# Patient Record
Sex: FEMALE | Race: BLACK OR AFRICAN AMERICAN | NOT HISPANIC OR LATINO | Employment: FULL TIME | URBAN - METROPOLITAN AREA
[De-identification: names, ages, dates, MRNs, and addresses within clinical notes are randomized per-mention and may not be internally consistent; named-entity substitution may affect disease eponyms.]

---

## 2021-06-21 ENCOUNTER — HOSPITAL ENCOUNTER (EMERGENCY)
Facility: HOSPITAL | Age: 31
Discharge: HOME | End: 2021-06-22
Attending: EMERGENCY MEDICINE | Admitting: EMERGENCY MEDICINE

## 2021-06-21 ENCOUNTER — APPOINTMENT (OUTPATIENT)
Dept: CT IMAGING | Facility: HOSPITAL | Age: 31
End: 2021-06-21

## 2021-06-21 DIAGNOSIS — D72.829 LEUKOCYTOSIS, UNSPECIFIED TYPE: ICD-10-CM

## 2021-06-21 DIAGNOSIS — K52.9 COLITIS: Primary | ICD-10-CM

## 2021-06-21 LAB
ALBUMIN SERPL-MCNC: 4.3 G/DL (ref 3.5–5.2)
ALBUMIN/GLOB SERPL: 1.3 G/DL
ALP SERPL-CCNC: 86 U/L (ref 39–117)
ALT SERPL W P-5'-P-CCNC: 11 U/L (ref 1–33)
ANION GAP SERPL CALCULATED.3IONS-SCNC: 11.5 MMOL/L (ref 5–15)
AST SERPL-CCNC: 15 U/L (ref 1–32)
BASOPHILS # BLD AUTO: 0.06 10*3/MM3 (ref 0–0.2)
BASOPHILS NFR BLD AUTO: 0.2 % (ref 0–1.5)
BILIRUB SERPL-MCNC: 0.5 MG/DL (ref 0–1.2)
BILIRUB UR QL STRIP: NEGATIVE
BUN SERPL-MCNC: 8 MG/DL (ref 6–20)
BUN/CREAT SERPL: 9.1 (ref 7–25)
CALCIUM SPEC-SCNC: 8.9 MG/DL (ref 8.6–10.5)
CHLORIDE SERPL-SCNC: 101 MMOL/L (ref 98–107)
CLARITY UR: CLEAR
CO2 SERPL-SCNC: 22.5 MMOL/L (ref 22–29)
COLOR UR: YELLOW
CREAT SERPL-MCNC: 0.88 MG/DL (ref 0.57–1)
DEPRECATED RDW RBC AUTO: 43.8 FL (ref 37–54)
EOSINOPHIL # BLD AUTO: 0.02 10*3/MM3 (ref 0–0.4)
EOSINOPHIL NFR BLD AUTO: 0.1 % (ref 0.3–6.2)
ERYTHROCYTE [DISTWIDTH] IN BLOOD BY AUTOMATED COUNT: 15.2 % (ref 12.3–15.4)
GFR SERPL CREATININE-BSD FRML MDRD: 90 ML/MIN/1.73
GLOBULIN UR ELPH-MCNC: 3.2 GM/DL
GLUCOSE SERPL-MCNC: 99 MG/DL (ref 65–99)
GLUCOSE UR STRIP-MCNC: NEGATIVE MG/DL
HCG INTACT+B SERPL-ACNC: <0.5 MIU/ML
HCT VFR BLD AUTO: 33 % (ref 34–46.6)
HGB BLD-MCNC: 11.7 G/DL (ref 12–15.9)
HGB UR QL STRIP.AUTO: NEGATIVE
HOLD SPECIMEN: NORMAL
HOLD SPECIMEN: NORMAL
IMM GRANULOCYTES # BLD AUTO: 0.27 10*3/MM3 (ref 0–0.05)
IMM GRANULOCYTES NFR BLD AUTO: 0.9 % (ref 0–0.5)
KETONES UR QL STRIP: NEGATIVE
LEUKOCYTE ESTERASE UR QL STRIP.AUTO: NEGATIVE
LIPASE SERPL-CCNC: 13 U/L (ref 13–60)
LYMPHOCYTES # BLD AUTO: 1.75 10*3/MM3 (ref 0.7–3.1)
LYMPHOCYTES NFR BLD AUTO: 6 % (ref 19.6–45.3)
MCH RBC QN AUTO: 28.5 PG (ref 26.6–33)
MCHC RBC AUTO-ENTMCNC: 35.5 G/DL (ref 31.5–35.7)
MCV RBC AUTO: 80.3 FL (ref 79–97)
MONOCYTES # BLD AUTO: 1.11 10*3/MM3 (ref 0.1–0.9)
MONOCYTES NFR BLD AUTO: 3.8 % (ref 5–12)
NEUTROPHILS NFR BLD AUTO: 25.77 10*3/MM3 (ref 1.7–7)
NEUTROPHILS NFR BLD AUTO: 89 % (ref 42.7–76)
NITRITE UR QL STRIP: NEGATIVE
NRBC BLD AUTO-RTO: 0 /100 WBC (ref 0–0.2)
PH UR STRIP.AUTO: 5.5 [PH] (ref 5–8)
PLATELET # BLD AUTO: 332 10*3/MM3 (ref 140–450)
PMV BLD AUTO: 10.8 FL (ref 6–12)
POTASSIUM SERPL-SCNC: 3.6 MMOL/L (ref 3.5–5.2)
PROT SERPL-MCNC: 7.5 G/DL (ref 6–8.5)
PROT UR QL STRIP: NEGATIVE
RBC # BLD AUTO: 4.11 10*6/MM3 (ref 3.77–5.28)
SODIUM SERPL-SCNC: 135 MMOL/L (ref 136–145)
SP GR UR STRIP: 1.02 (ref 1–1.03)
UROBILINOGEN UR QL STRIP: NORMAL
WBC # BLD AUTO: 28.98 10*3/MM3 (ref 3.4–10.8)
WHOLE BLOOD HOLD SPECIMEN: NORMAL

## 2021-06-21 PROCEDURE — 85025 COMPLETE CBC W/AUTO DIFF WBC: CPT

## 2021-06-21 PROCEDURE — 83690 ASSAY OF LIPASE: CPT

## 2021-06-21 PROCEDURE — 83605 ASSAY OF LACTIC ACID: CPT | Performed by: NURSE PRACTITIONER

## 2021-06-21 PROCEDURE — 36415 COLL VENOUS BLD VENIPUNCTURE: CPT

## 2021-06-21 PROCEDURE — 25010000002 ONDANSETRON PER 1 MG: Performed by: NURSE PRACTITIONER

## 2021-06-21 PROCEDURE — 87040 BLOOD CULTURE FOR BACTERIA: CPT | Performed by: NURSE PRACTITIONER

## 2021-06-21 PROCEDURE — 84702 CHORIONIC GONADOTROPIN TEST: CPT

## 2021-06-21 PROCEDURE — 80053 COMPREHEN METABOLIC PANEL: CPT

## 2021-06-21 PROCEDURE — 99283 EMERGENCY DEPT VISIT LOW MDM: CPT

## 2021-06-21 PROCEDURE — 81003 URINALYSIS AUTO W/O SCOPE: CPT

## 2021-06-21 PROCEDURE — 96374 THER/PROPH/DIAG INJ IV PUSH: CPT

## 2021-06-21 PROCEDURE — 74177 CT ABD & PELVIS W/CONTRAST: CPT

## 2021-06-21 RX ORDER — SODIUM CHLORIDE 0.9 % (FLUSH) 0.9 %
10 SYRINGE (ML) INJECTION AS NEEDED
Status: DISCONTINUED | OUTPATIENT
Start: 2021-06-21 | End: 2021-06-22 | Stop reason: HOSPADM

## 2021-06-21 RX ORDER — ONDANSETRON 2 MG/ML
4 INJECTION INTRAMUSCULAR; INTRAVENOUS ONCE
Status: COMPLETED | OUTPATIENT
Start: 2021-06-21 | End: 2021-06-21

## 2021-06-21 RX ADMIN — ONDANSETRON 4 MG: 2 INJECTION INTRAMUSCULAR; INTRAVENOUS at 23:40

## 2021-06-21 RX ADMIN — IOPAMIDOL 100 ML: 755 INJECTION, SOLUTION INTRAVENOUS at 23:51

## 2021-06-21 RX ADMIN — SODIUM CHLORIDE 1000 ML: 9 INJECTION, SOLUTION INTRAVENOUS at 23:40

## 2021-06-22 VITALS
WEIGHT: 157.63 LBS | TEMPERATURE: 98.2 F | HEIGHT: 60 IN | OXYGEN SATURATION: 99 % | HEART RATE: 87 BPM | SYSTOLIC BLOOD PRESSURE: 128 MMHG | BODY MASS INDEX: 30.95 KG/M2 | DIASTOLIC BLOOD PRESSURE: 64 MMHG | RESPIRATION RATE: 16 BRPM

## 2021-06-22 LAB — D-LACTATE SERPL-SCNC: 2.1 MMOL/L (ref 0.5–2)

## 2021-06-22 PROCEDURE — 0 IOPAMIDOL PER 1 ML: Performed by: EMERGENCY MEDICINE

## 2021-06-22 RX ORDER — ONDANSETRON 4 MG/1
4 TABLET, ORALLY DISINTEGRATING ORAL EVERY 8 HOURS PRN
Qty: 15 TABLET | Refills: 0 | Status: SHIPPED | OUTPATIENT
Start: 2021-06-22 | End: 2021-08-17

## 2021-06-22 RX ORDER — DICYCLOMINE HCL 20 MG
20 TABLET ORAL EVERY 6 HOURS
Qty: 20 TABLET | Refills: 0 | Status: SHIPPED | OUTPATIENT
Start: 2021-06-22 | End: 2021-08-17

## 2021-06-22 RX ORDER — AMOXICILLIN AND CLAVULANATE POTASSIUM 875; 125 MG/1; MG/1
1 TABLET, FILM COATED ORAL 2 TIMES DAILY
Qty: 20 TABLET | Refills: 0 | Status: SHIPPED | OUTPATIENT
Start: 2021-06-22 | End: 2021-07-02

## 2021-06-22 NOTE — ED PROVIDER NOTES
Subjective   Pt reports generalized abdominal pain since yesterday with low grade fever, nausea an bilateral flank pain.       History provided by:  Patient  Abdominal Pain  Pain location:  Generalized  Pain quality: aching    Pain radiates to:  Does not radiate  Pain severity:  Severe  Duration:  1 day  Timing:  Constant  Progression:  Worsening  Chronicity:  New  Context: not recent illness and not sick contacts    Relieved by:  Nothing  Worsened by:  Nothing  Ineffective treatments:  None tried  Associated symptoms: chills, fever and nausea    Associated symptoms: no chest pain, no cough, no diarrhea, no hematuria, no shortness of breath, no sore throat and no vomiting  Flatus: low grade.        Review of Systems   Constitutional: Positive for chills and fever.   HENT: Negative for congestion, ear pain and sore throat.    Eyes: Negative for pain.   Respiratory: Negative for cough, chest tightness and shortness of breath.    Cardiovascular: Negative for chest pain.   Gastrointestinal: Positive for abdominal pain and nausea. Negative for diarrhea and vomiting. Flatus: low grade.   Genitourinary: Positive for flank pain (bilateral). Negative for hematuria.   Musculoskeletal: Negative for joint swelling.   Skin: Negative for pallor.   Neurological: Negative for seizures and headaches.   All other systems reviewed and are negative.      Past Medical History:   Diagnosis Date   • Hypertension     during pregnancy       No Known Allergies    History reviewed. No pertinent surgical history.    History reviewed. No pertinent family history.    Social History     Socioeconomic History   • Marital status:      Spouse name: Not on file   • Number of children: Not on file   • Years of education: Not on file   • Highest education level: Not on file   Tobacco Use   • Smoking status: Former Smoker   • Smokeless tobacco: Never Used   Vaping Use   • Vaping Use: Every day   Substance and Sexual Activity   • Alcohol use:  Never           Objective   Physical Exam  Vitals and nursing note reviewed.   Constitutional:       General: She is not in acute distress.     Appearance: Normal appearance. She is not toxic-appearing.   HENT:      Head: Normocephalic and atraumatic.      Mouth/Throat:      Mouth: Mucous membranes are moist.   Eyes:      Extraocular Movements: Extraocular movements intact.      Pupils: Pupils are equal, round, and reactive to light.   Cardiovascular:      Rate and Rhythm: Normal rate and regular rhythm.      Pulses: Normal pulses.      Heart sounds: Normal heart sounds.   Pulmonary:      Effort: Pulmonary effort is normal. No respiratory distress.      Breath sounds: Normal breath sounds.   Abdominal:      General: Abdomen is flat.      Palpations: Abdomen is soft.      Tenderness: There is generalized abdominal tenderness.   Musculoskeletal:         General: Normal range of motion.      Cervical back: Normal range of motion and neck supple.   Skin:     General: Skin is warm and dry.      Capillary Refill: Capillary refill takes less than 2 seconds.   Neurological:      Mental Status: She is alert and oriented to person, place, and time. Mental status is at baseline.         Procedures           ED Course  ED Course as of Jun 22 0207   Tue Jun 22, 2021   0050 Discussed CT results with pt and need to follow up today with her PCP for re examination of abdomen. Discussed return to ER precuations, verbalized understanding.     [CM]      ED Course User Index  [CM] Jose Beal, PASTOR                                           MDM  Number of Diagnoses or Management Options  Colitis  Leukocytosis, unspecified type  Diagnosis management comments: Re examination of pt after fluids/Ct results, pt is non toxic in appearance and was resting comfortably on stretcher. She reports she feels a little better after fluids and medications. Due to elevated WBC she will be treated with antibiotics and advised to follow up with  her PCP.        Amount and/or Complexity of Data Reviewed  Clinical lab tests: reviewed and ordered  Tests in the radiology section of CPT®: reviewed and ordered  Discuss the patient with other providers: yes (Dr. Joy, ER attending)    Patient Progress  Patient progress: stable      Final diagnoses:   Colitis   Leukocytosis, unspecified type       ED Disposition  ED Disposition     ED Disposition Condition Comment    Discharge Stable           Provider, No Known  [facility] [id number]  [phone]    Schedule an appointment as soon as possible for a visit today  Re examination of your abdomen         Medication List      New Prescriptions    amoxicillin-clavulanate 875-125 MG per tablet  Commonly known as: AUGMENTIN  Take 1 tablet by mouth 2 (Two) Times a Day for 10 days.     dicyclomine 20 MG tablet  Commonly known as: BENTYL  Take 1 tablet by mouth Every 6 (Six) Hours.     ondansetron ODT 4 MG disintegrating tablet  Commonly known as: ZOFRAN-ODT  Place 1 tablet on the tongue Every 8 (Eight) Hours As Needed for Nausea or Vomiting.           Where to Get Your Medications      These medications were sent to [id number] - [place], [place] - [address] AT [facility] - [phone] [facility] [phone]   [address] [place] [address]    Hours: 24-hours Phone: [phone]   · amoxicillin-clavulanate 875-125 MG per tablet  · dicyclomine 20 MG tablet  · ondansetron ODT 4 MG disintegrating tablet          Jose Beal, APRN  06/22/21 0205

## 2021-06-27 LAB — BACTERIA SPEC AEROBE CULT: NORMAL

## 2021-07-15 ENCOUNTER — OFFICE VISIT (OUTPATIENT)
Dept: FAMILY MEDICINE CLINIC | Facility: CLINIC | Age: 31
End: 2021-07-15

## 2021-07-15 VITALS
HEART RATE: 105 BPM | BODY MASS INDEX: 31.29 KG/M2 | TEMPERATURE: 97.7 F | HEIGHT: 60 IN | OXYGEN SATURATION: 98 % | WEIGHT: 159.4 LBS | DIASTOLIC BLOOD PRESSURE: 78 MMHG | SYSTOLIC BLOOD PRESSURE: 132 MMHG

## 2021-07-15 DIAGNOSIS — D64.9 ANEMIA, UNSPECIFIED TYPE: ICD-10-CM

## 2021-07-15 DIAGNOSIS — R53.83 FATIGUE, UNSPECIFIED TYPE: ICD-10-CM

## 2021-07-15 DIAGNOSIS — D72.829 LEUKOCYTOSIS, UNSPECIFIED TYPE: ICD-10-CM

## 2021-07-15 DIAGNOSIS — Z13.220 SCREENING FOR LIPID DISORDERS: ICD-10-CM

## 2021-07-15 DIAGNOSIS — G47.00 INSOMNIA, UNSPECIFIED TYPE: ICD-10-CM

## 2021-07-15 DIAGNOSIS — K52.9 COLITIS: Primary | ICD-10-CM

## 2021-07-15 LAB
FERRITIN SERPL-MCNC: 22.7 NG/ML (ref 13–150)
IRON 24H UR-MRATE: 54 MCG/DL (ref 37–145)
IRON SATN MFR SERPL: 11 % (ref 20–50)
T-UPTAKE NFR SERPL: 1.13 TBI (ref 0.8–1.3)
T4 SERPL-MCNC: 6.49 MCG/DL (ref 4.5–11.7)
TIBC SERPL-MCNC: 484 MCG/DL (ref 298–536)
TRANSFERRIN SERPL-MCNC: 325 MG/DL (ref 200–360)
TSH SERPL DL<=0.05 MIU/L-ACNC: 1.04 UIU/ML (ref 0.27–4.2)

## 2021-07-15 PROCEDURE — 82306 VITAMIN D 25 HYDROXY: CPT | Performed by: NURSE PRACTITIONER

## 2021-07-15 PROCEDURE — 85007 BL SMEAR W/DIFF WBC COUNT: CPT | Performed by: NURSE PRACTITIONER

## 2021-07-15 PROCEDURE — 84436 ASSAY OF TOTAL THYROXINE: CPT | Performed by: NURSE PRACTITIONER

## 2021-07-15 PROCEDURE — 82607 VITAMIN B-12: CPT | Performed by: NURSE PRACTITIONER

## 2021-07-15 PROCEDURE — 82746 ASSAY OF FOLIC ACID SERUM: CPT | Performed by: NURSE PRACTITIONER

## 2021-07-15 PROCEDURE — 85027 COMPLETE CBC AUTOMATED: CPT | Performed by: NURSE PRACTITIONER

## 2021-07-15 PROCEDURE — 99204 OFFICE O/P NEW MOD 45 MIN: CPT | Performed by: NURSE PRACTITIONER

## 2021-07-15 PROCEDURE — 84443 ASSAY THYROID STIM HORMONE: CPT | Performed by: NURSE PRACTITIONER

## 2021-07-15 PROCEDURE — 82728 ASSAY OF FERRITIN: CPT | Performed by: NURSE PRACTITIONER

## 2021-07-15 PROCEDURE — 84479 ASSAY OF THYROID (T3 OR T4): CPT | Performed by: NURSE PRACTITIONER

## 2021-07-15 PROCEDURE — 84466 ASSAY OF TRANSFERRIN: CPT | Performed by: NURSE PRACTITIONER

## 2021-07-15 PROCEDURE — 83540 ASSAY OF IRON: CPT | Performed by: NURSE PRACTITIONER
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
## 2021-07-16 DIAGNOSIS — E55.9 VITAMIN D DEFICIENCY: Primary | ICD-10-CM

## 2021-07-16 LAB
25(OH)D3 SERPL-MCNC: 12.3 NG/ML
ANISOCYTOSIS BLD QL: ABNORMAL
DEPRECATED RDW RBC AUTO: 46.5 FL (ref 37–54)
EOSINOPHIL # BLD MANUAL: 0.35 10*3/MM3 (ref 0–0.4)
EOSINOPHIL NFR BLD MANUAL: 4.2 % (ref 0.3–6.2)
ERYTHROCYTE [DISTWIDTH] IN BLOOD BY AUTOMATED COUNT: 15.9 % (ref 12.3–15.4)
FOLATE SERPL-MCNC: 10.9 NG/ML (ref 4.78–24.2)
HCT VFR BLD AUTO: 34.1 % (ref 34–46.6)
HGB BLD-MCNC: 11.4 G/DL (ref 12–15.9)
LYMPHOCYTES # BLD MANUAL: 2.63 10*3/MM3 (ref 0.7–3.1)
LYMPHOCYTES NFR BLD MANUAL: 11.6 % (ref 5–12)
LYMPHOCYTES NFR BLD MANUAL: 31.6 % (ref 19.6–45.3)
MCH RBC QN AUTO: 27.1 PG (ref 26.6–33)
MCHC RBC AUTO-ENTMCNC: 33.4 G/DL (ref 31.5–35.7)
MCV RBC AUTO: 81.2 FL (ref 79–97)
MICROCYTES BLD QL: ABNORMAL
MONOCYTES # BLD AUTO: 0.96 10*3/MM3 (ref 0.1–0.9)
NEUTROPHILS # BLD AUTO: 4.37 10*3/MM3 (ref 1.7–7)
NEUTROPHILS NFR BLD MANUAL: 52.6 % (ref 42.7–76)
PLAT MORPH BLD: NORMAL
PLATELET # BLD AUTO: 428 10*3/MM3 (ref 140–450)
PMV BLD AUTO: 11.3 FL (ref 6–12)
POLYCHROMASIA BLD QL SMEAR: ABNORMAL
RBC # BLD AUTO: 4.2 10*6/MM3 (ref 3.77–5.28)
VIT B12 BLD-MCNC: 697 PG/ML (ref 211–946)
WBC # BLD AUTO: 8.31 10*3/MM3 (ref 3.4–10.8)
WBC MORPH BLD: NORMAL

## 2021-07-16 RX ORDER — ERGOCALCIFEROL 1.25 MG/1
50000 CAPSULE ORAL WEEKLY
Qty: 13 CAPSULE | Refills: 1 | Status: SHIPPED | OUTPATIENT
Start: 2021-07-16 | End: 2021-09-22 | Stop reason: SDUPTHER

## 2021-07-19 PROBLEM — D64.9 ANEMIA: Status: ACTIVE | Noted: 2021-07-19

## 2021-07-19 PROBLEM — K52.9 COLITIS: Status: ACTIVE | Noted: 2021-07-19

## 2021-07-19 PROBLEM — G47.00 INSOMNIA: Status: ACTIVE | Noted: 2021-07-19

## 2021-08-17 ENCOUNTER — OFFICE VISIT (OUTPATIENT)
Dept: FAMILY MEDICINE CLINIC | Facility: CLINIC | Age: 31
End: 2021-08-17

## 2021-08-17 VITALS
DIASTOLIC BLOOD PRESSURE: 78 MMHG | WEIGHT: 156.4 LBS | TEMPERATURE: 97.5 F | SYSTOLIC BLOOD PRESSURE: 126 MMHG | OXYGEN SATURATION: 100 % | HEART RATE: 89 BPM | HEIGHT: 60 IN | BODY MASS INDEX: 30.7 KG/M2

## 2021-08-17 DIAGNOSIS — D25.9 UTERINE LEIOMYOMA, UNSPECIFIED LOCATION: ICD-10-CM

## 2021-08-17 DIAGNOSIS — E55.9 VITAMIN D DEFICIENCY: Primary | ICD-10-CM

## 2021-08-17 PROBLEM — K52.9 COLITIS: Status: RESOLVED | Noted: 2021-07-19 | Resolved: 2021-08-17

## 2021-08-17 PROCEDURE — 99213 OFFICE O/P EST LOW 20 MIN: CPT | Performed by: NURSE PRACTITIONER

## 2021-08-17 NOTE — PROGRESS NOTES
"Chief Complaint  Follow-up and Ulcerative Colitis    Subjective          Madeline Veliz presents to [facility]  History of Present Illness  Presents today for a 1 month follow-up for colitis.  Since completing the antibiotic her symptoms have completely resolved.  CBC White blood count has returned to normal.  She is slightly anemic and hemoglobin 11.7.  She denies any abdominal pain diarrhea.  She started taking fiber supplements and having soft bowel movements daily.  She started vitamin D supplements 50,000 units once weekly.  CT scan showed uterine fibroid.  She has an appointment with Dr. Yanez next week.  Objective   Vital Signs:   /78   Pulse 89   Temp 97.5 °F (36.4 °C)   Ht 152.4 cm (60\")   Wt 70.9 kg (156 lb 6.4 oz)   SpO2 100%   BMI 30.54 kg/m²     Physical Exam  Vitals reviewed.   Constitutional:       Appearance: Normal appearance. She is well-developed and overweight.   HENT:      Head: Normocephalic and atraumatic.      Right Ear: External ear normal.      Left Ear: External ear normal.      Mouth/Throat:      Pharynx: No oropharyngeal exudate.   Eyes:      Conjunctiva/sclera: Conjunctivae normal.      Pupils: Pupils are equal, round, and reactive to light.   Cardiovascular:      Rate and Rhythm: Normal rate and regular rhythm.      Heart sounds: No murmur heard.   No friction rub. No gallop.    Pulmonary:      Effort: Pulmonary effort is normal.      Breath sounds: Normal breath sounds. No wheezing or rhonchi.   Abdominal:      General: Bowel sounds are normal. There is no distension.      Palpations: Abdomen is soft.      Tenderness: There is no abdominal tenderness.   Skin:     General: Skin is warm and dry.   Neurological:      Mental Status: She is alert and oriented to person, place, and time.      Cranial Nerves: No cranial nerve deficit.   Psychiatric:         Mood and Affect: Mood and affect normal.         Behavior: Behavior normal.         " Thought Content: Thought content normal.         Judgment: Judgment normal.        Result Review :                 Assessment and Plan    Diagnoses and all orders for this visit:    1. Vitamin D deficiency (Primary)  Assessment & Plan:  Continue vitamin D supplements      2. Uterine leiomyoma, unspecified location  Assessment & Plan:  Consultation with OB/GYN next week        Follow Up   Return in about 3 months (around 11/17/2021) for Next scheduled follow up.  Patient was given instructions and counseling regarding her condition or for health maintenance advice. Please see specific information pulled into the AVS if appropriate.

## 2021-08-27 ENCOUNTER — TELEPHONE (OUTPATIENT)
Dept: FAMILY MEDICINE CLINIC | Facility: CLINIC | Age: 31
End: 2021-08-27

## 2021-08-27 NOTE — TELEPHONE ENCOUNTER
Hub staff attempted to follow warm transfer process and was unsuccessful     Caller: Madeline Veliz    Relationship to patient: Self    Best call back number: [phone]    Patient is needing: SOONER THAN 9/8/21 THAT WAS FIRST AVAILABLE, INFORMED BLOOD PRESSURE TODAY /98

## 2021-08-30 ENCOUNTER — OFFICE VISIT (OUTPATIENT)
Dept: FAMILY MEDICINE CLINIC | Facility: CLINIC | Age: 31
End: 2021-08-30

## 2021-08-30 VITALS
HEART RATE: 77 BPM | TEMPERATURE: 98.1 F | WEIGHT: 155.2 LBS | OXYGEN SATURATION: 99 % | HEIGHT: 60 IN | SYSTOLIC BLOOD PRESSURE: 134 MMHG | BODY MASS INDEX: 30.47 KG/M2 | DIASTOLIC BLOOD PRESSURE: 92 MMHG

## 2021-08-30 DIAGNOSIS — I10 ESSENTIAL HYPERTENSION: Primary | ICD-10-CM

## 2021-08-30 PROCEDURE — 99213 OFFICE O/P EST LOW 20 MIN: CPT | Performed by: NURSE PRACTITIONER

## 2021-08-30 RX ORDER — LABETALOL 100 MG/1
100 TABLET, FILM COATED ORAL 2 TIMES DAILY
Qty: 60 TABLET | Refills: 0 | Status: SHIPPED | OUTPATIENT
Start: 2021-08-30 | End: 2021-09-22 | Stop reason: SDUPTHER

## 2021-08-30 RX ORDER — LOSARTAN POTASSIUM 25 MG/1
25 TABLET ORAL DAILY
Qty: 90 TABLET | Refills: 1 | Status: CANCELLED | OUTPATIENT
Start: 2021-08-30 | End: 2021-11-28

## 2021-08-30 NOTE — PROGRESS NOTES
"Chief Complaint  Hypertension    Subjective        Past Medical History:   Diagnosis Date   • Anxiety    • Depression    • Hypertension     during pregnancy     No past surgical history on file.  Social History     Socioeconomic History   • Marital status:      Spouse name: Not on file   • Number of children: Not on file   • Years of education: Not on file   • Highest education level: Not on file   Tobacco Use   • Smoking status: Former Smoker     Packs/day: 0.25     Years: 10.00     Pack years: 2.50     Quit date: 2021     Years since quittin.3   • Smokeless tobacco: Never Used   Vaping Use   • Vaping Use: Every day   • Substances: Nicotine, Flavoring   • Devices: Disposable   Substance and Sexual Activity   • Alcohol use: Never       Madeline Veliz presents to [facility]  Hypertension  This is a new problem. The current episode started in the past 7 days. The problem is unchanged. The problem is uncontrolled. Pertinent negatives include no anxiety, blurred vision, chest pain, headaches, peripheral edema or shortness of breath. Risk factors for coronary artery disease include family history. Past treatments include nothing. Current antihypertension treatment includes nothing. There is no history of angina or kidney disease. There is no history of chronic renal disease.       Objective   Vital Signs:   /92   Pulse 77   Temp 98.1 °F (36.7 °C)   Ht 152.4 cm (60\")   Wt 70.4 kg (155 lb 3.2 oz)   SpO2 99%   BMI 30.31 kg/m²     Physical Exam  Vitals reviewed.   Constitutional:       Appearance: Normal appearance. She is well-developed.   HENT:      Head: Normocephalic and atraumatic.   Eyes:      Conjunctiva/sclera: Conjunctivae normal.      Pupils: Pupils are equal, round, and reactive to light.   Cardiovascular:      Rate and Rhythm: Normal rate and regular rhythm.      Heart sounds: No murmur heard.   No friction rub. No gallop.    Pulmonary:      Effort: " Pulmonary effort is normal.      Breath sounds: Normal breath sounds. No wheezing or rhonchi.   Skin:     General: Skin is warm and dry.   Neurological:      Mental Status: She is alert and oriented to person, place, and time.   Psychiatric:         Mood and Affect: Mood and affect normal.         Behavior: Behavior normal.         Thought Content: Thought content normal.         Judgment: Judgment normal.        Result Review :   The following data was reviewed by: PASTOR Castaneda on 08/30/2021:  Common labs    Common Labsle 6/21/21 6/21/21 7/15/21    2100 2100    Glucose  99    BUN  8    Creatinine  0.88    eGFR African Am  90    Sodium  135 (A)    Potassium  3.6    Chloride  101    Calcium  8.9    Albumin  4.30    Total Bilirubin  0.5    Alkaline Phosphatase  86    AST (SGOT)  15    ALT (SGPT)  11    WBC 28.98 (A)  8.31   Hemoglobin 11.7 (A)  11.4 (A)   Hematocrit 33.0 (A)  34.1   Platelets 332  428   (A) Abnormal value                       Current Outpatient Medications on File Prior to Visit   Medication Sig Dispense Refill   • vitamin D (ERGOCALCIFEROL) 1.25 MG (98918 UT) capsule capsule Take 1 capsule by mouth 1 (One) Time Per Week. 13 capsule 1     No current facility-administered medications on file prior to visit.       Assessment and Plan    Diagnoses and all orders for this visit:    1. Essential hypertension (Primary)  Comments:  Will start on labetalol 100 mg twice a day discussed with patient to titrate on the medication, giving guidelines for blood pressure, discussed purchasing a blood pressure machine, have patient follow-up with Jude Flowers her PCP in 2 weeks.  Patient is trying to get pregnant which is why we started on labetalol.  She did have a history of preeclampsia in her previous pregnancy, her father has a history of hypertension.    Other orders  -     Cancel: losartan (COZAAR) 25 MG tablet; Take 1 tablet by mouth Daily for 90 days.  Dispense: 90 tablet; Refill: 1  -      labetalol (NORMODYNE) 100 MG tablet; Take 1 tablet by mouth 2 (Two) Times a Day for 30 days.  Dispense: 60 tablet; Refill: 0        Follow Up   No follow-ups on file.  Patient was given instructions and counseling regarding her condition or for health maintenance advice. Please see specific information pulled into the AVS if appropriate.

## 2021-09-13 ENCOUNTER — OFFICE VISIT (OUTPATIENT)
Dept: FAMILY MEDICINE CLINIC | Facility: CLINIC | Age: 31
End: 2021-09-13

## 2021-09-13 VITALS
HEIGHT: 60 IN | WEIGHT: 155.6 LBS | BODY MASS INDEX: 30.55 KG/M2 | TEMPERATURE: 97.5 F | SYSTOLIC BLOOD PRESSURE: 124 MMHG | DIASTOLIC BLOOD PRESSURE: 82 MMHG

## 2021-09-13 DIAGNOSIS — Z09 FOLLOW UP: Primary | ICD-10-CM

## 2021-09-13 DIAGNOSIS — I10 ESSENTIAL HYPERTENSION: ICD-10-CM

## 2021-09-13 PROCEDURE — 99213 OFFICE O/P EST LOW 20 MIN: CPT | Performed by: NURSE PRACTITIONER

## 2021-09-13 RX ORDER — IBUPROFEN 600 MG/1
TABLET ORAL
COMMUNITY
Start: 2021-09-08

## 2021-09-13 NOTE — PROGRESS NOTES
Chief Complaint  Hypertension and Follow-up    Subjective        Past Medical History:   Diagnosis Date   • Anxiety    • Depression    • Hypertension     during pregnancy     Social History     Socioeconomic History   • Marital status:      Spouse name: Not on file   • Number of children: Not on file   • Years of education: Not on file   • Highest education level: Not on file   Tobacco Use   • Smoking status: Former Smoker     Packs/day: 0.25     Years: 10.00     Pack years: 2.50     Quit date: 2021     Years since quittin.3   • Smokeless tobacco: Never Used   Vaping Use   • Vaping Use: Every day   • Substances: Nicotine, Flavoring   • Devices: Disposable   Substance and Sexual Activity   • Alcohol use: Never     History reviewed. No pertinent surgical history.    Madeline Veliz presents to [facility]  2 week follow up.  Patient was started in office on labetalol 2 weeks ago she is here for follow-up on her blood pressure.  Blood pressure today is 1982.  She does admit the medication makes her some what fatigue.  Otherwise patient states she is tolerating the medication all right.  She is currently trying to get pregnant which is why she was started on labetalol as opposed to more, and blood pressure medications.  Labetalol is safe during pregnancy.  Patient did have issues with preeclampsia last pregnancy.    Hypertension  This is a recurrent problem. The current episode started more than 1 month ago. The problem has been waxing and waning since onset. The problem is resistant. Pertinent negatives include no anxiety, blurred vision, peripheral edema or shortness of breath. There are no associated agents to hypertension. Risk factors for coronary artery disease include family history. Past treatments include nothing. Current antihypertension treatment includes beta blockers. The current treatment provides mild improvement. There are no compliance problems.   "There is no history of angina. There is no history of chronic renal disease.       Objective   Vital Signs:   /82   Temp 97.5 °F (36.4 °C)   Ht 152.4 cm (60\")   Wt 70.6 kg (155 lb 9.6 oz)   BMI 30.39 kg/m²     Physical Exam  Vitals reviewed.   Constitutional:       Appearance: Normal appearance. She is well-developed.   HENT:      Head: Normocephalic and atraumatic.   Eyes:      Conjunctiva/sclera: Conjunctivae normal.      Pupils: Pupils are equal, round, and reactive to light.   Cardiovascular:      Rate and Rhythm: Normal rate and regular rhythm.      Heart sounds: No murmur heard.   No friction rub.   Pulmonary:      Effort: Pulmonary effort is normal.      Breath sounds: Normal breath sounds. No wheezing or rhonchi.   Skin:     General: Skin is warm and dry.   Neurological:      Mental Status: She is alert and oriented to person, place, and time.   Psychiatric:         Mood and Affect: Mood and affect normal.         Behavior: Behavior normal.         Thought Content: Thought content normal.         Judgment: Judgment normal.        Result Review :   The following data was reviewed by: PASTOR Castaneda on 09/13/2021:  CMP    CMP 6/21/21   Glucose 99   BUN 8   Creatinine 0.88   eGFR African Am 90   Sodium 135 (A)   Potassium 3.6   Chloride 101   Calcium 8.9   Albumin 4.30   Total Bilirubin 0.5   Alkaline Phosphatase 86   AST (SGOT) 15   ALT (SGPT) 11   (A) Abnormal value            CBC    CBC 6/21/21 7/15/21   WBC 28.98 (A) 8.31   RBC 4.11 4.20   Hemoglobin 11.7 (A) 11.4 (A)   Hematocrit 33.0 (A) 34.1   MCV 80.3 81.2   MCH 28.5 27.1   MCHC 35.5 33.4   RDW 15.2 15.9 (A)   Platelets 332 428   (A) Abnormal value                TSH    TSH 7/15/21   TSH 1.040                          Current Outpatient Medications on File Prior to Visit   Medication Sig Dispense Refill   • ibuprofen (ADVIL,MOTRIN) 600 MG tablet TAKE 1 TABLET BY MOUTH EVERY 6 HOURS WITH FOOD OR MILK AS NEEDED     • labetalol " (NORMODYNE) 100 MG tablet Take 1 tablet by mouth 2 (Two) Times a Day for 30 days. 60 tablet 0   • vitamin D (ERGOCALCIFEROL) 1.25 MG (92740 UT) capsule capsule Take 1 capsule by mouth 1 (One) Time Per Week. 13 capsule 1     No current facility-administered medications on file prior to visit.       Assessment and Plan    Diagnoses and all orders for this visit:    1. Follow up (Primary)    2. Essential hypertension  Comments:  Blood pressure has improved will continue on labetalol and have patient follow-up in 1 month and reassess blood pressure at that time.        Follow Up   Return in about 4 weeks (around 10/11/2021).  Patient was given instructions and counseling regarding her condition or for health maintenance advice. Please see specific information pulled into the AVS if appropriate.

## 2021-09-22 ENCOUNTER — TELEPHONE (OUTPATIENT)
Dept: FAMILY MEDICINE CLINIC | Facility: CLINIC | Age: 31
End: 2021-09-22

## 2021-09-22 ENCOUNTER — OFFICE VISIT (OUTPATIENT)
Dept: FAMILY MEDICINE CLINIC | Facility: CLINIC | Age: 31
End: 2021-09-22

## 2021-09-22 VITALS
SYSTOLIC BLOOD PRESSURE: 124 MMHG | OXYGEN SATURATION: 99 % | DIASTOLIC BLOOD PRESSURE: 82 MMHG | HEIGHT: 60 IN | HEART RATE: 65 BPM | TEMPERATURE: 97.7 F | WEIGHT: 155.8 LBS | BODY MASS INDEX: 30.59 KG/M2

## 2021-09-22 DIAGNOSIS — E55.9 VITAMIN D DEFICIENCY: ICD-10-CM

## 2021-09-22 DIAGNOSIS — I10 ESSENTIAL HYPERTENSION: ICD-10-CM

## 2021-09-22 DIAGNOSIS — Z09 FOLLOW UP: Primary | ICD-10-CM

## 2021-09-22 PROCEDURE — 99213 OFFICE O/P EST LOW 20 MIN: CPT | Performed by: NURSE PRACTITIONER

## 2021-09-22 RX ORDER — LABETALOL 100 MG/1
100 TABLET, FILM COATED ORAL 2 TIMES DAILY
Qty: 180 TABLET | Refills: 1 | Status: SHIPPED | OUTPATIENT
Start: 2021-09-22 | End: 2021-10-11

## 2021-09-22 RX ORDER — ERGOCALCIFEROL 1.25 MG/1
50000 CAPSULE ORAL WEEKLY
Qty: 13 CAPSULE | Refills: 1 | Status: SHIPPED | OUTPATIENT
Start: 2021-09-22 | End: 2022-10-17 | Stop reason: SDUPTHER

## 2021-09-22 NOTE — TELEPHONE ENCOUNTER
Tried to call pt to let them know that she needs to see her PCP for a follow up. Was unable to get a hold of PT

## 2021-09-22 NOTE — PROGRESS NOTES
"Chief Complaint  Hypertension and Follow-up    Subjective        Social History     Socioeconomic History   • Marital status:      Spouse name: Not on file   • Number of children: Not on file   • Years of education: Not on file   • Highest education level: Not on file   Tobacco Use   • Smoking status: Former Smoker     Packs/day: 0.25     Years: 10.00     Pack years: 2.50     Quit date: 2021     Years since quittin.3   • Smokeless tobacco: Never Used   Vaping Use   • Vaping Use: Every day   • Substances: Nicotine, Flavoring   • Devices: Disposable   Substance and Sexual Activity   • Alcohol use: Never     Past Medical History:   Diagnosis Date   • Anxiety    • Depression    • Hypertension     during pregnancy     No past surgical history on file.    Madeline Veliz presents to [facility]  Hypertension  This is a recurrent problem. The current episode started more than 1 month ago. The problem has been waxing and waning since onset. The problem is controlled (now). Associated symptoms include anxiety, headaches and malaise/fatigue. Pertinent negatives include no palpitations, peripheral edema or shortness of breath. There are no associated agents to hypertension. Risk factors for coronary artery disease include obesity and smoking/tobacco exposure. Past treatments include nothing. Current antihypertension treatment includes beta blockers. The current treatment provides significant improvement. There are no compliance problems.  There is no history of angina or retinopathy. There is no history of chronic renal disease or a thyroid problem.       Objective   Vital Signs:   /82   Pulse 65   Temp 97.7 °F (36.5 °C)   Ht 152.4 cm (60\")   Wt 70.7 kg (155 lb 12.8 oz)   SpO2 99%   BMI 30.43 kg/m²     Physical Exam  Vitals reviewed.   Constitutional:       Appearance: Normal appearance. She is well-developed.   HENT:      Head: Normocephalic and atraumatic. "   Eyes:      Conjunctiva/sclera: Conjunctivae normal.      Pupils: Pupils are equal, round, and reactive to light.   Cardiovascular:      Rate and Rhythm: Normal rate and regular rhythm.      Heart sounds: No murmur heard.   No friction rub. No gallop.    Pulmonary:      Effort: Pulmonary effort is normal.      Breath sounds: Normal breath sounds. No wheezing or rhonchi.   Skin:     General: Skin is warm and dry.   Neurological:      Mental Status: She is alert and oriented to person, place, and time.   Psychiatric:         Mood and Affect: Mood and affect normal.         Behavior: Behavior normal.         Thought Content: Thought content normal.         Judgment: Judgment normal.        Result Review :   The following data was reviewed by: PASTOR Castaneda on 09/22/2021:  CMP    CMP 6/21/21   Glucose 99   BUN 8   Creatinine 0.88   eGFR African Am 90   Sodium 135 (A)   Potassium 3.6   Chloride 101   Calcium 8.9   Albumin 4.30   Total Bilirubin 0.5   Alkaline Phosphatase 86   AST (SGOT) 15   ALT (SGPT) 11   (A) Abnormal value            CBC w/diff    CBC w/Diff 6/21/21 7/15/21   WBC 28.98 (A) 8.31   RBC 4.11 4.20   Hemoglobin 11.7 (A) 11.4 (A)   Hematocrit 33.0 (A) 34.1   MCV 80.3 81.2   MCH 28.5 27.1   MCHC 35.5 33.4   RDW 15.2 15.9 (A)   Platelets 332 428   Neutrophil Rel % 89.0 (A)    Immature Granulocyte Rel % 0.9 (A)    Lymphocyte Rel % 6.0 (A)    Monocyte Rel % 3.8 (A)    Eosinophil Rel % 0.1 (A)    Basophil Rel % 0.2    (A) Abnormal value                TSH    TSH 7/15/21   TSH 1.040                     Assessment and Plan    Diagnoses and all orders for this visit:    1. Follow up (Primary)    2. Essential hypertension  Comments:  Doing well on the medication, she is currently on labetalol because she is wanting to get pregnant.  Blood pressure controlled at 124/78 denies any headache torrey chest pain or change in vision.  We will continue on current medication regimen    3. Vitamin D deficiency  -      vitamin D (ERGOCALCIFEROL) 1.25 MG (49956 UT) capsule capsule; Take 1 capsule by mouth 1 (One) Time Per Week.  Dispense: 13 capsule; Refill: 1    Other orders  -     labetalol (NORMODYNE) 100 MG tablet; Take 1 tablet by mouth 2 (Two) Times a Day for 90 days.  Dispense: 180 tablet; Refill: 1        Follow Up   No follow-ups on file.  Patient was given instructions and counseling regarding her condition or for health maintenance advice. Please see specific information pulled into the AVS if appropriate.

## 2021-10-10 RX ORDER — LABETALOL 100 MG/1
TABLET, FILM COATED ORAL
Qty: 60 TABLET | Status: CANCELLED | OUTPATIENT
Start: 2021-10-10

## 2021-10-11 RX ORDER — LABETALOL 100 MG/1
TABLET, FILM COATED ORAL
Qty: 60 TABLET | Refills: 2 | Status: SHIPPED | OUTPATIENT
Start: 2021-10-11 | End: 2022-01-24

## 2022-01-10 ENCOUNTER — TRANSCRIBE ORDERS (OUTPATIENT)
Dept: LAB | Facility: HOSPITAL | Age: 32
End: 2022-01-10

## 2022-01-10 DIAGNOSIS — Z20.2 EXPOSURE TO SEXUALLY TRANSMITTED DISEASE (STD): Primary | ICD-10-CM

## 2022-01-11 ENCOUNTER — LAB (OUTPATIENT)
Dept: LAB | Facility: HOSPITAL | Age: 32
End: 2022-01-11

## 2022-01-11 DIAGNOSIS — Z20.2 EXPOSURE TO SEXUALLY TRANSMITTED DISEASE (STD): ICD-10-CM

## 2022-01-11 LAB
HAV IGM SERPL QL IA: NORMAL
HBV CORE IGM SERPL QL IA: NORMAL
HBV SURFACE AG SERPL QL IA: NORMAL
HCV AB SER DONR QL: NORMAL
HIV1+2 AB SER QL: NORMAL

## 2022-01-11 PROCEDURE — G0432 EIA HIV-1/HIV-2 SCREEN: HCPCS

## 2022-01-11 PROCEDURE — 80074 ACUTE HEPATITIS PANEL: CPT

## 2022-01-11 PROCEDURE — 36415 COLL VENOUS BLD VENIPUNCTURE: CPT

## 2022-01-11 PROCEDURE — 86593 SYPHILIS TEST NON-TREP QUANT: CPT

## 2022-01-15 LAB — RPR SER-TITR: NON REACTIVE {TITER}

## 2022-01-24 ENCOUNTER — TELEPHONE (OUTPATIENT)
Dept: FAMILY MEDICINE CLINIC | Facility: CLINIC | Age: 32
End: 2022-01-24

## 2022-01-24 ENCOUNTER — OFFICE VISIT (OUTPATIENT)
Dept: FAMILY MEDICINE CLINIC | Facility: CLINIC | Age: 32
End: 2022-01-24

## 2022-01-24 VITALS
HEIGHT: 60 IN | SYSTOLIC BLOOD PRESSURE: 134 MMHG | BODY MASS INDEX: 30 KG/M2 | DIASTOLIC BLOOD PRESSURE: 90 MMHG | WEIGHT: 152.8 LBS | HEART RATE: 92 BPM | TEMPERATURE: 98.1 F | OXYGEN SATURATION: 100 %

## 2022-01-24 DIAGNOSIS — I10 PRIMARY HYPERTENSION: Primary | ICD-10-CM

## 2022-01-24 DIAGNOSIS — M25.562 ACUTE PAIN OF LEFT KNEE: ICD-10-CM

## 2022-01-24 DIAGNOSIS — E55.9 VITAMIN D DEFICIENCY: ICD-10-CM

## 2022-01-24 LAB
25(OH)D3 SERPL-MCNC: 36.4 NG/ML (ref 30–100)
ALBUMIN SERPL-MCNC: 4.6 G/DL (ref 3.5–5.2)
ALBUMIN/GLOB SERPL: 1.8 G/DL
ALP SERPL-CCNC: 69 U/L (ref 39–117)
ALT SERPL W P-5'-P-CCNC: 12 U/L (ref 1–33)
ANION GAP SERPL CALCULATED.3IONS-SCNC: 7.9 MMOL/L (ref 5–15)
AST SERPL-CCNC: 21 U/L (ref 1–32)
BASOPHILS # BLD MANUAL: 0.08 10*3/MM3 (ref 0–0.2)
BASOPHILS NFR BLD MANUAL: 1 % (ref 0–1.5)
BILIRUB SERPL-MCNC: 0.2 MG/DL (ref 0–1.2)
BUN SERPL-MCNC: 7 MG/DL (ref 6–20)
BUN/CREAT SERPL: 8.3 (ref 7–25)
CALCIUM SPEC-SCNC: 9.8 MG/DL (ref 8.6–10.5)
CHLORIDE SERPL-SCNC: 103 MMOL/L (ref 98–107)
CHOLEST SERPL-MCNC: 190 MG/DL (ref 0–200)
CO2 SERPL-SCNC: 26.1 MMOL/L (ref 22–29)
CREAT SERPL-MCNC: 0.84 MG/DL (ref 0.57–1)
DEPRECATED RDW RBC AUTO: 47.5 FL (ref 37–54)
EOSINOPHIL # BLD MANUAL: 0.24 10*3/MM3 (ref 0–0.4)
EOSINOPHIL NFR BLD MANUAL: 3 % (ref 0.3–6.2)
ERYTHROCYTE [DISTWIDTH] IN BLOOD BY AUTOMATED COUNT: 18 % (ref 12.3–15.4)
GFR SERPL CREATININE-BSD FRML MDRD: 95 ML/MIN/1.73
GLOBULIN UR ELPH-MCNC: 2.6 GM/DL
GLUCOSE SERPL-MCNC: 92 MG/DL (ref 65–99)
HCT VFR BLD AUTO: 32.4 % (ref 34–46.6)
HDLC SERPL-MCNC: 62 MG/DL (ref 40–60)
HGB BLD-MCNC: 10.5 G/DL (ref 12–15.9)
LDLC SERPL CALC-MCNC: 113 MG/DL (ref 0–100)
LDLC/HDLC SERPL: 1.79 {RATIO}
LYMPHOCYTES # BLD MANUAL: 3.26 10*3/MM3 (ref 0.7–3.1)
LYMPHOCYTES NFR BLD MANUAL: 6 % (ref 5–12)
MCH RBC QN AUTO: 24.2 PG (ref 26.6–33)
MCHC RBC AUTO-ENTMCNC: 32.4 G/DL (ref 31.5–35.7)
MCV RBC AUTO: 74.8 FL (ref 79–97)
MONOCYTES # BLD: 0.49 10*3/MM3 (ref 0.1–0.9)
NEUTROPHILS # BLD AUTO: 4.08 10*3/MM3 (ref 1.7–7)
NEUTROPHILS NFR BLD MANUAL: 50 % (ref 42.7–76)
NRBC BLD AUTO-RTO: 0 /100 WBC (ref 0–0.2)
PLAT MORPH BLD: NORMAL
PLATELET # BLD AUTO: 460 10*3/MM3 (ref 140–450)
PMV BLD AUTO: 10.9 FL (ref 6–12)
POTASSIUM SERPL-SCNC: 4.3 MMOL/L (ref 3.5–5.2)
PROT SERPL-MCNC: 7.2 G/DL (ref 6–8.5)
RBC # BLD AUTO: 4.33 10*6/MM3 (ref 3.77–5.28)
RBC MORPH BLD: NORMAL
SODIUM SERPL-SCNC: 137 MMOL/L (ref 136–145)
TRIGL SERPL-MCNC: 84 MG/DL (ref 0–150)
VARIANT LYMPHS NFR BLD MANUAL: 40 % (ref 19.6–45.3)
VLDLC SERPL-MCNC: 15 MG/DL (ref 5–40)
WBC MORPH BLD: NORMAL
WBC NRBC COR # BLD: 8.16 10*3/MM3 (ref 3.4–10.8)

## 2022-01-24 PROCEDURE — 80053 COMPREHEN METABOLIC PANEL: CPT | Performed by: NURSE PRACTITIONER

## 2022-01-24 PROCEDURE — 82728 ASSAY OF FERRITIN: CPT | Performed by: NURSE PRACTITIONER

## 2022-01-24 PROCEDURE — 84466 ASSAY OF TRANSFERRIN: CPT | Performed by: NURSE PRACTITIONER

## 2022-01-24 PROCEDURE — 85025 COMPLETE CBC W/AUTO DIFF WBC: CPT | Performed by: NURSE PRACTITIONER

## 2022-01-24 PROCEDURE — 85007 BL SMEAR W/DIFF WBC COUNT: CPT | Performed by: NURSE PRACTITIONER

## 2022-01-24 PROCEDURE — 99214 OFFICE O/P EST MOD 30 MIN: CPT | Performed by: NURSE PRACTITIONER

## 2022-01-24 PROCEDURE — 80061 LIPID PANEL: CPT | Performed by: NURSE PRACTITIONER

## 2022-01-24 PROCEDURE — 83540 ASSAY OF IRON: CPT | Performed by: NURSE PRACTITIONER

## 2022-01-24 PROCEDURE — 82306 VITAMIN D 25 HYDROXY: CPT | Performed by: NURSE PRACTITIONER

## 2022-01-24 RX ORDER — HYDROCHLOROTHIAZIDE 12.5 MG/1
12.5 TABLET ORAL DAILY
Qty: 30 TABLET | Refills: 0 | Status: SHIPPED | OUTPATIENT
Start: 2022-01-24 | End: 2022-03-03

## 2022-01-24 NOTE — PROGRESS NOTES
"Chief Complaint  Hypertension    Subjective          Medical History: has a past medical history of Anxiety, Depression, and Hypertension.     Surgical History: has no past surgical history on file.     Family History: family history includes Diabetes in her paternal grandmother.     Social History: reports that she quit smoking about 8 months ago. Her smoking use included cigarettes. She has a 2.50 pack-year smoking history. She has never used smokeless tobacco. She reports that she does not drink alcohol.    Madeline Veliz presents to [facility]  Hypertension  This is a chronic problem. The current episode started more than 1 year ago. The problem has been waxing and waning since onset. The problem is resistant. Associated symptoms include anxiety. Pertinent negatives include no chest pain, peripheral edema or shortness of breath. There are no associated agents to hypertension. There are no known risk factors for coronary artery disease. Past treatments include beta blockers. Current antihypertension treatment includes beta blockers. The current treatment provides mild improvement.   Knee Pain   The incident occurred more than 1 week ago (1 month). There was no injury mechanism. The pain is present in the left knee. The pain is at a severity of 5/10. The pain is moderate. The pain has been fluctuating since onset. Pertinent negatives include no loss of motion, loss of sensation, numbness or tingling. Associated symptoms comments: + for buckling. She reports no foreign bodies present. The symptoms are aggravated by movement. She has tried NSAIDs for the symptoms. The treatment provided no relief.       Objective   Vital Signs:   /90   Pulse 92   Temp 98.1 °F (36.7 °C)   Ht 152.4 cm (60\")   Wt 69.3 kg (152 lb 12.8 oz)   SpO2 100%   BMI 29.84 kg/m²     Physical Exam  Vitals reviewed.   Constitutional:       Appearance: Normal appearance. She is well-developed.   HENT: "      Head: Normocephalic and atraumatic.   Eyes:      Conjunctiva/sclera: Conjunctivae normal.      Pupils: Pupils are equal, round, and reactive to light.   Cardiovascular:      Rate and Rhythm: Normal rate and regular rhythm.      Heart sounds: No murmur heard.  No friction rub.   Pulmonary:      Effort: Pulmonary effort is normal.      Breath sounds: Normal breath sounds. No wheezing or rhonchi.   Musculoskeletal:      Right knee: Normal.      Left knee: No swelling, deformity or erythema. Tenderness present.   Skin:     General: Skin is warm and dry.   Neurological:      Mental Status: She is alert and oriented to person, place, and time.   Psychiatric:         Mood and Affect: Mood and affect normal.         Behavior: Behavior normal.         Thought Content: Thought content normal.         Judgment: Judgment normal.        Result Review :   The following data was reviewed by: PASTOR Castaneda on 01/24/2022:  Common labs    Common Labsle 6/21/21 6/21/21 7/15/21    2100 2100    Glucose  99    BUN  8    Creatinine  0.88    eGFR African Am  90    Sodium  135 (A)    Potassium  3.6    Chloride  101    Calcium  8.9    Albumin  4.30    Total Bilirubin  0.5    Alkaline Phosphatase  86    AST (SGOT)  15    ALT (SGPT)  11    WBC 28.98 (A)  8.31   Hemoglobin 11.7 (A)  11.4 (A)   Hematocrit 33.0 (A)  34.1   Platelets 332  428   (A) Abnormal value            Data reviewed: Previous office note            Current Outpatient Medications on File Prior to Visit   Medication Sig Dispense Refill   • ibuprofen (ADVIL,MOTRIN) 600 MG tablet TAKE 1 TABLET BY MOUTH EVERY 6 HOURS WITH FOOD OR MILK AS NEEDED     • vitamin D (ERGOCALCIFEROL) 1.25 MG (91198 UT) capsule capsule Take 1 capsule by mouth 1 (One) Time Per Week. 13 capsule 1   • [DISCONTINUED] labetalol (NORMODYNE) 100 MG tablet TAKE 1 TABLET BY MOUTH TWICE DAILY 60 tablet 2     No current facility-administered medications on file prior to visit.        Assessment and  Plan    Diagnoses and all orders for this visit:    1. Primary hypertension (Primary)  Comments:  Patient is no longer wanting to get pregnant we will stop labetalol start on HCTZ and have patient follow-up in 1 month.  Orders:  -     CBC & Differential  -     Comprehensive Metabolic Panel  -     Lipid Panel  -     Vitamin D 25 Hydroxy    2. Vitamin D deficiency  Comments:  History of recheck labs today.  Patient is agreeable treatment plan.  Orders:  -     Vitamin D 25 Hydroxy    3. Acute pain of left knee  Comments:  Will give diclofenac sodium gel to use as needed, up to 4 times a day, and x-ray if needed for any acute bony abnormality.  Orders:  -     XR Knee 3 View Left; Future    Other orders  -     Diclofenac Sodium (VOLTAREN) 1 % gel gel; Apply 4 g topically to the appropriate area as directed 4 (Four) Times a Day As Needed (knee pain).  Dispense: 100 g; Refill: 2  -     hydroCHLOROthiazide (HYDRODIURIL) 12.5 MG tablet; Take 1 tablet by mouth Daily.  Dispense: 30 tablet; Refill: 0        Follow Up   Return in about 1 month (around 2/24/2022) for Recheck, blood pressure.  Patient was given instructions and counseling regarding her condition or for health maintenance advice. Please see specific information pulled into the AVS if appropriate.

## 2022-01-24 NOTE — TELEPHONE ENCOUNTER
, Current PCP poonam bravo and Adriano Curiel agree this patient may switch PCP to Adriano Curiel as of 1/24/2022

## 2022-01-25 ENCOUNTER — DOCUMENTATION (OUTPATIENT)
Dept: FAMILY MEDICINE CLINIC | Facility: CLINIC | Age: 32
End: 2022-01-25

## 2022-01-25 DIAGNOSIS — D64.9 LOW HEMOGLOBIN AND LOW HEMATOCRIT: Primary | ICD-10-CM

## 2022-01-25 LAB
FERRITIN SERPL-MCNC: 7.65 NG/ML (ref 13–150)
IRON 24H UR-MRATE: 30 MCG/DL (ref 37–145)
IRON SATN MFR SERPL: 6 % (ref 20–50)
TIBC SERPL-MCNC: 526 MCG/DL (ref 298–536)
TRANSFERRIN SERPL-MCNC: 353 MG/DL (ref 200–360)

## 2022-01-29 PROCEDURE — U0004 COV-19 TEST NON-CDC HGH THRU: HCPCS | Performed by: NURSE PRACTITIONER

## 2022-02-02 RX ORDER — DOXYCYCLINE HYCLATE 50 MG/1
324 CAPSULE, GELATIN COATED ORAL 2 TIMES DAILY
Qty: 180 TABLET | Refills: 1 | Status: SHIPPED | OUTPATIENT
Start: 2022-02-02

## 2022-03-03 RX ORDER — HYDROCHLOROTHIAZIDE 12.5 MG/1
12.5 TABLET ORAL DAILY
Qty: 30 TABLET | Refills: 0 | Status: SHIPPED | OUTPATIENT
Start: 2022-03-03

## 2022-03-04 ENCOUNTER — TELEPHONE (OUTPATIENT)
Dept: FAMILY MEDICINE CLINIC | Facility: CLINIC | Age: 32
End: 2022-03-04

## 2022-03-04 NOTE — TELEPHONE ENCOUNTER
PATIENT MISSED APPOINTMENT ON 02/24/2022 @ 1045 WITH RENE PATEL. CALLED NUMBER IN CHART [phone]. SPOKE TO PATIENT- STATED SHE WAS SICK. EXPLAINED POLICY CONCERNING MISSED APPOINTMENTS AND SAME DAY CANCELLATIONS. RESCHEDULED APPOINTMENT

## 2022-03-30 ENCOUNTER — OFFICE VISIT (OUTPATIENT)
Dept: FAMILY MEDICINE CLINIC | Facility: CLINIC | Age: 32
End: 2022-03-30

## 2022-03-30 VITALS
HEART RATE: 84 BPM | BODY MASS INDEX: 27.23 KG/M2 | OXYGEN SATURATION: 98 % | DIASTOLIC BLOOD PRESSURE: 84 MMHG | WEIGHT: 148 LBS | HEIGHT: 62 IN | TEMPERATURE: 96.9 F | SYSTOLIC BLOOD PRESSURE: 122 MMHG

## 2022-03-30 DIAGNOSIS — Z09 FOLLOW UP: Primary | ICD-10-CM

## 2022-03-30 DIAGNOSIS — I10 ESSENTIAL HYPERTENSION: ICD-10-CM

## 2022-03-30 PROCEDURE — 99213 OFFICE O/P EST LOW 20 MIN: CPT | Performed by: NURSE PRACTITIONER

## 2022-03-30 NOTE — PROGRESS NOTES
"Chief Complaint  Hypertension and Follow-up    Subjective          Medical History: has a past medical history of Anxiety, Depression, and Hypertension.     Surgical History: has no past surgical history on file.     Family History: family history includes Diabetes in her paternal grandmother.     Social History: reports that she quit smoking about 10 months ago. Her smoking use included cigarettes. She has a 2.50 pack-year smoking history. She has never used smokeless tobacco. She reports that she does not drink alcohol and does not use drugs.    Madeline Veliz presents to [facility]  Hypertension  This is a recurrent problem. The current episode started more than 1 year ago. The problem has been waxing and waning since onset. The problem is controlled. Pertinent negatives include no anxiety, chest pain, headaches, malaise/fatigue, peripheral edema or shortness of breath. There are no associated agents to hypertension. Past treatments include beta blockers. Current antihypertension treatment includes diuretics. There are no compliance problems.  There is no history of angina, CVA or retinopathy.       Objective   Vital Signs:   /84   Pulse 84   Temp 96.9 °F (36.1 °C)   Ht 157.5 cm (62\")   Wt 67.1 kg (148 lb)   SpO2 98%   BMI 27.07 kg/m²     Physical Exam  Vitals reviewed.   Constitutional:       Appearance: Normal appearance. She is well-developed.   HENT:      Head: Normocephalic and atraumatic.   Eyes:      Conjunctiva/sclera: Conjunctivae normal.      Pupils: Pupils are equal, round, and reactive to light.   Cardiovascular:      Rate and Rhythm: Normal rate and regular rhythm.      Heart sounds: No murmur heard.  Pulmonary:      Effort: Pulmonary effort is normal.      Breath sounds: Normal breath sounds. No wheezing or rhonchi.   Skin:     General: Skin is warm and dry.   Neurological:      Mental Status: She is alert and oriented to person, place, and time. "   Psychiatric:         Mood and Affect: Mood and affect normal.         Behavior: Behavior normal.         Thought Content: Thought content normal.         Judgment: Judgment normal.        Result Review :   The following data was reviewed by: PASTOR Castaneda on 03/30/2022:  Common labs    Common Labsle 6/21/21 6/21/21 7/15/21 1/24/22 1/24/22 1/24/22    2100 2100  1120 1120 1120   Glucose  99  92     BUN  8  7     Creatinine  0.88  0.84     eGFR African Am  90  95     Sodium  135 (A)  137     Potassium  3.6  4.3     Chloride  101  103     Calcium  8.9  9.8     Albumin  4.30  4.60     Total Bilirubin  0.5  0.2     Alkaline Phosphatase  86  69     AST (SGOT)  15  21     ALT (SGPT)  11  12     WBC 28.98 (A)  8.31   8.16   Hemoglobin 11.7 (A)  11.4 (A)   10.5 (A)   Hematocrit 33.0 (A)  34.1   32.4 (A)   Platelets 332  428   460 (A)   Total Cholesterol     190    Triglycerides     84    HDL Cholesterol     62 (A)    LDL Cholesterol      113 (A)    (A) Abnormal value                        Current Outpatient Medications on File Prior to Visit   Medication Sig Dispense Refill   • Diclofenac Sodium (VOLTAREN) 1 % gel gel Apply 4 g topically to the appropriate area as directed 4 (Four) Times a Day As Needed (knee pain). 100 g 2   • ferrous gluconate (FERGON) 324 MG tablet Take 1 tablet by mouth 2 (Two) Times a Day. 180 tablet 1   • hydroCHLOROthiazide (HYDRODIURIL) 12.5 MG tablet TAKE 1 TABLET BY MOUTH DAILY 30 tablet 0   • ibuprofen (ADVIL,MOTRIN) 600 MG tablet TAKE 1 TABLET BY MOUTH EVERY 6 HOURS WITH FOOD OR MILK AS NEEDED     • vitamin D (ERGOCALCIFEROL) 1.25 MG (97748 UT) capsule capsule Take 1 capsule by mouth 1 (One) Time Per Week. 13 capsule 1     No current facility-administered medications on file prior to visit.        Assessment and Plan    Diagnoses and all orders for this visit:    1. Follow up (Primary)    2. Essential hypertension  Comments:  Blood pressures well controlled at 122/80.  Denies any  headache, chest pain or change in vision.  We will continue on current dose of hydrochlorothiazide        Follow Up   Return in about 6 months (around 9/30/2022) for Recheck.  Patient was given instructions and counseling regarding her condition or for health maintenance advice. Please see specific information pulled into the AVS if appropriate.

## 2022-10-14 ENCOUNTER — OFFICE VISIT (OUTPATIENT)
Dept: FAMILY MEDICINE CLINIC | Facility: CLINIC | Age: 32
End: 2022-10-14

## 2022-10-14 VITALS
WEIGHT: 142 LBS | SYSTOLIC BLOOD PRESSURE: 130 MMHG | OXYGEN SATURATION: 95 % | DIASTOLIC BLOOD PRESSURE: 80 MMHG | HEIGHT: 60 IN | BODY MASS INDEX: 27.88 KG/M2 | TEMPERATURE: 97.3 F | HEART RATE: 91 BPM

## 2022-10-14 DIAGNOSIS — Z00.00 ANNUAL PHYSICAL EXAM: Primary | ICD-10-CM

## 2022-10-14 DIAGNOSIS — E55.9 VITAMIN D DEFICIENCY: ICD-10-CM

## 2022-10-14 DIAGNOSIS — D50.9 IRON DEFICIENCY ANEMIA, UNSPECIFIED IRON DEFICIENCY ANEMIA TYPE: ICD-10-CM

## 2022-10-14 DIAGNOSIS — I10 ESSENTIAL HYPERTENSION: ICD-10-CM

## 2022-10-14 DIAGNOSIS — M25.562 CHRONIC PAIN OF LEFT KNEE: ICD-10-CM

## 2022-10-14 DIAGNOSIS — G89.29 CHRONIC PAIN OF LEFT KNEE: ICD-10-CM

## 2022-10-14 LAB
25(OH)D3 SERPL-MCNC: 24.5 NG/ML (ref 30–100)
ALBUMIN SERPL-MCNC: 4.3 G/DL (ref 3.5–5.2)
ALBUMIN/GLOB SERPL: 1.7 G/DL
ALP SERPL-CCNC: 66 U/L (ref 39–117)
ALT SERPL W P-5'-P-CCNC: 10 U/L (ref 1–33)
ANION GAP SERPL CALCULATED.3IONS-SCNC: 9.5 MMOL/L (ref 5–15)
ANISOCYTOSIS BLD QL: ABNORMAL
AST SERPL-CCNC: 22 U/L (ref 1–32)
BASOPHILS # BLD MANUAL: 0.05 10*3/MM3 (ref 0–0.2)
BASOPHILS NFR BLD MANUAL: 1 % (ref 0–1.5)
BILIRUB SERPL-MCNC: 0.2 MG/DL (ref 0–1.2)
BUN SERPL-MCNC: 8 MG/DL (ref 6–20)
BUN/CREAT SERPL: 9.4 (ref 7–25)
CALCIUM SPEC-SCNC: 9 MG/DL (ref 8.6–10.5)
CHLORIDE SERPL-SCNC: 103 MMOL/L (ref 98–107)
CHOLEST SERPL-MCNC: 181 MG/DL (ref 0–200)
CO2 SERPL-SCNC: 24.5 MMOL/L (ref 22–29)
CREAT SERPL-MCNC: 0.85 MG/DL (ref 0.57–1)
DEPRECATED RDW RBC AUTO: 57.6 FL (ref 37–54)
EGFRCR SERPLBLD CKD-EPI 2021: 92.3 ML/MIN/1.73
EOSINOPHIL # BLD MANUAL: 0.15 10*3/MM3 (ref 0–0.4)
EOSINOPHIL NFR BLD MANUAL: 3.1 % (ref 0.3–6.2)
ERYTHROCYTE [DISTWIDTH] IN BLOOD BY AUTOMATED COUNT: 23.3 % (ref 12.3–15.4)
FERRITIN SERPL-MCNC: 13 NG/ML (ref 13–150)
GLOBULIN UR ELPH-MCNC: 2.6 GM/DL
GLUCOSE SERPL-MCNC: 88 MG/DL (ref 65–99)
HCT VFR BLD AUTO: 30.8 % (ref 34–46.6)
HDLC SERPL-MCNC: 74 MG/DL (ref 40–60)
HGB BLD-MCNC: 9.4 G/DL (ref 12–15.9)
IRON 24H UR-MRATE: 20 MCG/DL (ref 37–145)
IRON SATN MFR SERPL: 4 % (ref 20–50)
LDLC SERPL CALC-MCNC: 95 MG/DL (ref 0–100)
LDLC/HDLC SERPL: 1.28 {RATIO}
LYMPHOCYTES # BLD MANUAL: 1.59 10*3/MM3 (ref 0.7–3.1)
LYMPHOCYTES NFR BLD MANUAL: 4.1 % (ref 5–12)
MCH RBC QN AUTO: 22 PG (ref 26.6–33)
MCHC RBC AUTO-ENTMCNC: 30.5 G/DL (ref 31.5–35.7)
MCV RBC AUTO: 72 FL (ref 79–97)
MICROCYTES BLD QL: ABNORMAL
MONOCYTES # BLD: 0.2 10*3/MM3 (ref 0.1–0.9)
NEUTROPHILS # BLD AUTO: 2.83 10*3/MM3 (ref 1.7–7)
NEUTROPHILS NFR BLD MANUAL: 58.8 % (ref 42.7–76)
PLAT MORPH BLD: NORMAL
PLATELET # BLD AUTO: 466 10*3/MM3 (ref 140–450)
PMV BLD AUTO: 10 FL (ref 6–12)
POTASSIUM SERPL-SCNC: 4.3 MMOL/L (ref 3.5–5.2)
PROT SERPL-MCNC: 6.9 G/DL (ref 6–8.5)
RBC # BLD AUTO: 4.28 10*6/MM3 (ref 3.77–5.28)
SODIUM SERPL-SCNC: 137 MMOL/L (ref 136–145)
TIBC SERPL-MCNC: 514 MCG/DL (ref 298–536)
TRANSFERRIN SERPL-MCNC: 345 MG/DL (ref 200–360)
TRIGL SERPL-MCNC: 63 MG/DL (ref 0–150)
TSH SERPL DL<=0.05 MIU/L-ACNC: 1.18 UIU/ML (ref 0.27–4.2)
VARIANT LYMPHS NFR BLD MANUAL: 33 % (ref 19.6–45.3)
VLDLC SERPL-MCNC: 12 MG/DL (ref 5–40)
WBC MORPH BLD: NORMAL
WBC NRBC COR # BLD: 4.81 10*3/MM3 (ref 3.4–10.8)

## 2022-10-14 PROCEDURE — 82728 ASSAY OF FERRITIN: CPT | Performed by: NURSE PRACTITIONER

## 2022-10-14 PROCEDURE — 80050 GENERAL HEALTH PANEL: CPT | Performed by: NURSE PRACTITIONER

## 2022-10-14 PROCEDURE — 80061 LIPID PANEL: CPT | Performed by: NURSE PRACTITIONER

## 2022-10-14 PROCEDURE — 82306 VITAMIN D 25 HYDROXY: CPT | Performed by: NURSE PRACTITIONER

## 2022-10-14 PROCEDURE — 99213 OFFICE O/P EST LOW 20 MIN: CPT | Performed by: NURSE PRACTITIONER

## 2022-10-14 PROCEDURE — 84466 ASSAY OF TRANSFERRIN: CPT | Performed by: NURSE PRACTITIONER

## 2022-10-14 PROCEDURE — 85007 BL SMEAR W/DIFF WBC COUNT: CPT | Performed by: NURSE PRACTITIONER

## 2022-10-14 PROCEDURE — 2014F MENTAL STATUS ASSESS: CPT | Performed by: NURSE PRACTITIONER

## 2022-10-14 PROCEDURE — 99395 PREV VISIT EST AGE 18-39: CPT | Performed by: NURSE PRACTITIONER

## 2022-10-14 PROCEDURE — 3008F BODY MASS INDEX DOCD: CPT | Performed by: NURSE PRACTITIONER

## 2022-10-14 PROCEDURE — 83540 ASSAY OF IRON: CPT | Performed by: NURSE PRACTITIONER

## 2022-10-14 RX ORDER — SODIUM FLUORIDE 6 MG/ML
PASTE, DENTIFRICE DENTAL
COMMUNITY
Start: 2022-09-09

## 2022-10-14 NOTE — PROGRESS NOTES
Chief Complaint  Hypertension (Follow up) and Annual Exam    Subjective          Medical History: has a past medical history of Anxiety, Depression, and Hypertension.     Surgical History: has no past surgical history on file.     Family History: family history includes Diabetes in her paternal grandmother; Hypertension in her father; French Parkinson White syndrome in her mother.     Social History: reports that she quit smoking about 17 months ago. Her smoking use included cigarettes. She has a 2.50 pack-year smoking history. She has never used smokeless tobacco. She reports that she does not drink alcohol and does not use drugs.    Madeline Veliz presents to [facility]  History of Present Illness    ENCOUNTER DATE:  10/14/2022     Madeline Veliz / 32 y.o. / female      CHIEF COMPLAINT    No chief complaint on file.      VITALS    There were no vitals taken for this visit.    BP Readings from Last 3 Encounters:  03/30/22 : 122/84  01/29/22 : 128/89  01/24/22 : 134/90    Wt Readings from Last 3 Encounters:  03/30/22 : 67.1 kg (148 lb)  01/29/22 : 68.9 kg (152 lb)  01/24/22 : 69.3 kg (152 lb 12.8 oz)      HISTORY OF PRESENT ILLNESS    Madeline presents for annual health maintenance visit.    Last health maintenance visit: approximately 1 year ago  General health: good  Lifestyle:  Attempting to lose weight?: Yes   Diet: eats a well balanced, healthy diet  Exercise: exercises 4 days/week  Tobacco: Vapes regularly    Alcohol: does not drink  Work: Full-time  Reproductive health:  Sexually active?: No   Sexual problems?: No problems  Concern for STD?: No    Sees Gynecologist?: No   Dhara/Postmenopausal?: No   Domestic abuse concerns: No   Depression Screening:     PHQ-2: 0 (Not depressed)  PHQ-9: 0 (Negative screening for depression)    Patient Care Team:  Adriano Curiel APRN as PCP - General (Nurse Practitioner)      ALLERGIES  No Known Allergies     PFSH:     The  following portions of the patient's history were reviewed and updated as appropriate: Allergies / Current Medications / Past Medical History / Surgical History / Social History / Family History    PROBLEM LIST   Patient Active Problem List:    Anemia    Insomnia    Vitamin D deficiency    Uterine fibroid      PAST MEDICAL HISTORY  Past Medical History:  No date: Anxiety  No date: Depression  No date: Hypertension     Comment:  during pregnancy    SURGICAL HISTORY  No past surgical history on file.    SOCIAL HISTORY  Social History   Socioeconomic History     Marital status:    Tobacco Use     Smoking status: Former       Packs/day: 0.25       Years: 10.00       Pack years: 2.5       Types: Cigarettes       Quit date: 2021       Years since quittin.4     Smokeless tobacco: Never   Vaping Use     Vaping Use: Every day       Substances: Nicotine, Flavoring       Devices: Disposable   Substance and Sexual Activity     Alcohol use: Never     Drug use: Never     Sexual activity: Defer      FAMILY HISTORY  Review of patient's family history indicates:      IMMUNIZATION HISTORY    Immunization History  Administered            Date(s) Administered   COVID-19 (PFIZER) PURPLE CAP                         2021     Hepatitis B Vaccine Adult IM                         2003  10/17/2003     MMR                   1999     Td                    2003     Tdap                  2016      Labs:    Lab Results      Component                Value               Date                      NA                       137                 2022                K                        4.3                 2022                CALCIUM                  9.8                 2022                AST                      21                  2022                ALT                      12                  2022                BUN                      7                    01/24/2022                CREATININE               0.84                01/24/2022                CREATININE               0.88                06/21/2021                CREATININE               0.74                06/18/2020                EGFRIFAFRI               95                  01/24/2022              Lab Results      Component                Value               Date                      TSH                      1.040               07/15/2021              Lab Results      Component                Value               Date                      LDL                      113 (H)             01/24/2022                HDL                      62 (H)              01/24/2022                TRIG                     84                  01/24/2022              Lab Results      Component                Value               Date                      [id number]                 36.4                01/24/2022               Lab Results      Component                Value               Date                      WBC                      8.16                01/24/2022                HGB                      10.5 (L)            01/24/2022                MCV                      74.8 (L)            01/24/2022                PLT                      460 (H)             01/24/2022              Lab Results      Component                Value               Date                      GLUCOSEU                 Negative            06/21/2021                BLOODU                   Negative            06/21/2021                NITRITEU                 Negative            06/21/2021                LEUKOCYTESUR             Negative            06/21/2021              Lab Results      Component                Value               Date                      HEPCVIRUSABY             Non-Reactive        01/11/2022                    Cancer Screening:  Colon: Initial/Next screening at age: 43  Repeat colon cancer screening: N/A at this time  Breast:  "Recommended monthly self exams; annual professional exam  Mammogram: N/A at this time  Cervical: up to date 2/2022  Skin: Monthly self skin examination, annual exam by health professional      Lifestyle Counseling:  Lifestyle Modifications: Attempt to lose weight, Continue good lifestyle choices/modifications and Improve dietary compliance  Safety Issues: Always wear seatbelt, Avoid texting while driving   Use sunscreen, regular skin examination  Recommended annual dental/vision examination.  Emotional/Stress/Sleep: Reviewed and  given when appropriate  Knee Pain   The incident occurred more than 1 week ago. There was no injury mechanism. The pain is present in the left knee. The pain is mild. The pain has been intermittent since onset. Pertinent negatives include no inability to bear weight, loss of motion, numbness or tingling. She reports no foreign bodies present. The symptoms are aggravated by weight bearing and palpation. She has tried rest and NSAIDs for the symptoms. The treatment provided mild relief.     Hypertension  This is a recurrent problem. The current episode started more than 1 year ago. The problem has been waxing and waning since onset. The problem is controlled. Pertinent negatives include no anxiety, chest pain, headaches, malaise/fatigue, peripheral edema or shortness of breath. There are no associated agents to hypertension. Past treatments include beta blockers. Current antihypertension treatment includes diuretics. There are no compliance problems.  There is no history of angina, CVA or retinopathy.     Objective   Vital Signs:   /80 (BP Location: Right arm, Patient Position: Sitting, Cuff Size: Adult)   Pulse 91   Temp 97.3 °F (36.3 °C) (Infrared)   Ht 152.4 cm (60\")   Wt 64.4 kg (142 lb)   SpO2 95%   BMI 27.73 kg/m²     Physical Exam  Vitals reviewed.   Constitutional:       Appearance: Normal appearance. She is well-developed.   HENT:      Head: Normocephalic and " atraumatic.   Eyes:      Conjunctiva/sclera: Conjunctivae normal.      Pupils: Pupils are equal, round, and reactive to light.   Cardiovascular:      Rate and Rhythm: Normal rate and regular rhythm.      Heart sounds: No murmur heard.  Pulmonary:      Effort: Pulmonary effort is normal.      Breath sounds: Normal breath sounds. No wheezing or rhonchi.   Musculoskeletal:      Left knee: Bony tenderness and crepitus present.   Skin:     General: Skin is warm and dry.   Neurological:      Mental Status: She is alert and oriented to person, place, and time.   Psychiatric:         Mood and Affect: Mood and affect normal.         Behavior: Behavior normal.         Thought Content: Thought content normal.         Judgment: Judgment normal.        Result Review :   The following data was reviewed by: PASTOR Castaneda on 10/14/2022:  Common labs    Common Labs 1/24/22 1/24/22 1/24/22    1120 1120 1120   Glucose 92     BUN 7     Creatinine 0.84     eGFR African Am 95     Sodium 137     Potassium 4.3     Chloride 103     Calcium 9.8     Albumin 4.60     Total Bilirubin 0.2     Alkaline Phosphatase 69     AST (SGOT) 21     ALT (SGPT) 12     WBC   8.16   Hemoglobin   10.5 (A)   Hematocrit   32.4 (A)   Platelets   460 (A)   Total Cholesterol  190    Triglycerides  84    HDL Cholesterol  62 (A)    LDL Cholesterol   113 (A)    (A) Abnormal value            Data reviewed: Previous office note            Current Outpatient Medications on File Prior to Visit   Medication Sig Dispense Refill   • Diclofenac Sodium (VOLTAREN) 1 % gel gel Apply 4 g topically to the appropriate area as directed 4 (Four) Times a Day As Needed (knee pain). 100 g 2   • ferrous gluconate (FERGON) 324 MG tablet Take 1 tablet by mouth 2 (Two) Times a Day. 180 tablet 1   • hydroCHLOROthiazide (HYDRODIURIL) 12.5 MG tablet TAKE 1 TABLET BY MOUTH DAILY 30 tablet 0   • ibuprofen (ADVIL,MOTRIN) 600 MG tablet TAKE 1 TABLET BY MOUTH EVERY 6 HOURS WITH FOOD OR  MILK AS NEEDED     • Sodium Fluoride 5000 PPM 1.1 % paste      • vitamin D (ERGOCALCIFEROL) 1.25 MG (50129 UT) capsule capsule Take 1 capsule by mouth 1 (One) Time Per Week. 13 capsule 1     No current facility-administered medications on file prior to visit.        Assessment and Plan    Diagnoses and all orders for this visit:    1. Annual physical exam (Primary)  -     CBC & Differential  -     Comprehensive Metabolic Panel  -     Lipid Panel  -     TSH    2. Essential hypertension  Comments:  Pressure stable, will continue on current medication regimen.  Orders:  -     CBC & Differential  -     Comprehensive Metabolic Panel    3. Iron deficiency anemia, unspecified iron deficiency anemia type  Comments:  We will check labs, adjust medication if needed  Orders:  -     Iron Profile  -     Ferritin    4. Vitamin D deficiency  Comments:  Will check labs, adjust medication if needed  Orders:  -     Vitamin D 25 Hydroxy    5. Chronic pain of left knee  Comments:  Will reorder x-ray, patient is agreeable treatment plan.  Orders:  -     XR Knee 3 View Left; Future        Follow Up   Return in about 6 months (around 4/14/2023) for Recheck.  Patient was given instructions and counseling regarding her condition or for health maintenance advice. Please see specific information pulled into the AVS if appropriate.

## 2022-10-17 DIAGNOSIS — D50.9 IRON DEFICIENCY ANEMIA, UNSPECIFIED IRON DEFICIENCY ANEMIA TYPE: Primary | ICD-10-CM

## 2022-10-17 DIAGNOSIS — E55.9 VITAMIN D DEFICIENCY: ICD-10-CM

## 2022-10-17 RX ORDER — ERGOCALCIFEROL 1.25 MG/1
50000 CAPSULE ORAL WEEKLY
Qty: 13 CAPSULE | Refills: 1 | Status: SHIPPED | OUTPATIENT
Start: 2022-10-17

## 2022-10-31 ENCOUNTER — APPOINTMENT (OUTPATIENT)
Dept: ONCOLOGY | Facility: HOSPITAL | Age: 32
End: 2022-10-31

## 2022-12-08 ENCOUNTER — CONSULT (OUTPATIENT)
Dept: ONCOLOGY | Facility: HOSPITAL | Age: 32
End: 2022-12-08

## 2022-12-08 ENCOUNTER — LAB (OUTPATIENT)
Dept: ONCOLOGY | Facility: HOSPITAL | Age: 32
End: 2022-12-08

## 2022-12-08 VITALS
TEMPERATURE: 97.9 F | DIASTOLIC BLOOD PRESSURE: 76 MMHG | WEIGHT: 142.86 LBS | RESPIRATION RATE: 16 BRPM | OXYGEN SATURATION: 100 % | BODY MASS INDEX: 27.9 KG/M2 | SYSTOLIC BLOOD PRESSURE: 126 MMHG | HEART RATE: 99 BPM

## 2022-12-08 DIAGNOSIS — D50.9 IRON DEFICIENCY ANEMIA, UNSPECIFIED IRON DEFICIENCY ANEMIA TYPE: Primary | ICD-10-CM

## 2022-12-08 DIAGNOSIS — D50.9 IRON DEFICIENCY ANEMIA, UNSPECIFIED IRON DEFICIENCY ANEMIA TYPE: ICD-10-CM

## 2022-12-08 DIAGNOSIS — D50.9 MICROCYTIC ANEMIA: ICD-10-CM

## 2022-12-08 DIAGNOSIS — N92.0 MENORRHAGIA WITH REGULAR CYCLE: ICD-10-CM

## 2022-12-08 LAB
ANISOCYTOSIS BLD QL: ABNORMAL
DEPRECATED RDW RBC AUTO: 45.1 FL (ref 37–54)
EOSINOPHIL # BLD MANUAL: 0.33 10*3/MM3 (ref 0–0.4)
EOSINOPHIL NFR BLD MANUAL: 5 % (ref 0.3–6.2)
ERYTHROCYTE [DISTWIDTH] IN BLOOD BY AUTOMATED COUNT: 18.7 % (ref 12.3–15.4)
FERRITIN SERPL-MCNC: 5.06 NG/ML (ref 13–150)
HCT VFR BLD AUTO: 29.2 % (ref 34–46.6)
HGB BLD-MCNC: 9.7 G/DL (ref 12–15.9)
IRON 24H UR-MRATE: 31 MCG/DL (ref 37–145)
IRON SATN MFR SERPL: 6 % (ref 20–50)
LYMPHOCYTES # BLD MANUAL: 2.43 10*3/MM3 (ref 0.7–3.1)
MCH RBC QN AUTO: 22.6 PG (ref 26.6–33)
MCHC RBC AUTO-ENTMCNC: 33.2 G/DL (ref 31.5–35.7)
MCV RBC AUTO: 67.9 FL (ref 79–97)
MICROCYTES BLD QL: ABNORMAL
NEUTROPHILS # BLD AUTO: 3.8 10*3/MM3 (ref 1.7–7)
NEUTROPHILS NFR BLD MANUAL: 52 % (ref 42.7–76)
NEUTS BAND NFR BLD MANUAL: 6 % (ref 0–5)
PATHOLOGY REVIEW: YES
PLATELET # BLD AUTO: 333 10*3/MM3 (ref 140–450)
PMV BLD AUTO: 9.4 FL (ref 6–12)
RBC # BLD AUTO: 4.3 10*6/MM3 (ref 3.77–5.28)
RETICS # AUTO: 0.06 10*6/MM3 (ref 0.02–0.13)
RETICS/RBC NFR AUTO: 1.35 % (ref 0.7–1.9)
SMALL PLATELETS BLD QL SMEAR: ADEQUATE
TIBC SERPL-MCNC: 524 MCG/DL (ref 298–536)
TRANSFERRIN SERPL-MCNC: 352 MG/DL (ref 200–360)
VARIANT LYMPHS NFR BLD MANUAL: 37 % (ref 19.6–45.3)
WBC MORPH BLD: NORMAL
WBC NRBC COR # BLD: 6.56 10*3/MM3 (ref 3.4–10.8)

## 2022-12-08 PROCEDURE — 84466 ASSAY OF TRANSFERRIN: CPT | Performed by: NURSE PRACTITIONER

## 2022-12-08 PROCEDURE — 83020 HEMOGLOBIN ELECTROPHORESIS: CPT | Performed by: NURSE PRACTITIONER

## 2022-12-08 PROCEDURE — 99214 OFFICE O/P EST MOD 30 MIN: CPT | Performed by: NURSE PRACTITIONER

## 2022-12-08 PROCEDURE — 82607 VITAMIN B-12: CPT | Performed by: NURSE PRACTITIONER

## 2022-12-08 PROCEDURE — 83540 ASSAY OF IRON: CPT | Performed by: NURSE PRACTITIONER

## 2022-12-08 PROCEDURE — 82728 ASSAY OF FERRITIN: CPT | Performed by: NURSE PRACTITIONER

## 2022-12-08 PROCEDURE — 36415 COLL VENOUS BLD VENIPUNCTURE: CPT | Performed by: NURSE PRACTITIONER

## 2022-12-08 PROCEDURE — 83021 HEMOGLOBIN CHROMOTOGRAPHY: CPT | Performed by: NURSE PRACTITIONER

## 2022-12-08 PROCEDURE — 85045 AUTOMATED RETICULOCYTE COUNT: CPT | Performed by: NURSE PRACTITIONER

## 2022-12-08 PROCEDURE — 85025 COMPLETE CBC W/AUTO DIFF WBC: CPT | Performed by: NURSE PRACTITIONER

## 2022-12-08 PROCEDURE — G0463 HOSPITAL OUTPT CLINIC VISIT: HCPCS | Performed by: NURSE PRACTITIONER

## 2022-12-08 PROCEDURE — 82746 ASSAY OF FOLIC ACID SERUM: CPT | Performed by: NURSE PRACTITIONER

## 2022-12-08 NOTE — PROGRESS NOTES
Chief Complaint/Reason for Referral:  Iron deficiency anemia      Adriano Curiel, *  Adriano Curiel, APRN    Records Obtained:  Records of the patients history including those obtained from  [facility] and patient information were reviewed and summarized in detail.    Madeline Veliz presents to [facility] for iron deficinency anemia    Subjective  History of present illness: Ms. Madeline Veliz presents for new patient evaluation for iron deficiency anemia. She is a pleasant 32 year old  female. Medical history of heavy menses since she started her menstrual cycles which last from 5-7 days and reports all the days ae heavy. Also has depression, anxiety, anemia, insomnia, vitamin D deficiency, history of uterine fibroids. Former smoker of 1 pack every 2-3 days and quit. She now uses Vape. Denies any alcohol use. She was started on oral iron 1 tab QD but reports she has not been compliant with taking the oral iron, but does does states when she did take iron tablets, she was having nausea. She has never had prior iron infusions in the past. Reports she has fatigue, but works 6 days a week as a  at [employer] here locally. Reports craving ice.     Recent labs 10/14/2022: Iron 20, ferritin 13, iron sat of 4%, TIBC 514, WBC 4.81, hemoglobin 9.4. MCV 72.0, platelet count 466.      Oncology/Hematology History    No history exists.       Review of Systems   Constitutional: Positive for fatigue.   HENT: Negative.    Eyes: Negative.    Respiratory: Negative.    Cardiovascular: Negative.    Gastrointestinal: Negative.    Endocrine: Negative.    Genitourinary: Negative.    Musculoskeletal: Negative.    Allergic/Immunologic: Negative.    Neurological: Negative.    Hematological: Negative.    Psychiatric/Behavioral: Negative.        Current Outpatient Medications on File Prior to Visit   Medication Sig Dispense Refill   • Diclofenac Sodium (VOLTAREN) 1  % gel gel Apply 4 g topically to the appropriate area as directed 4 (Four) Times a Day As Needed (knee pain). 100 g 2   • ferrous gluconate (FERGON) 324 MG tablet Take 1 tablet by mouth 2 (Two) Times a Day. 180 tablet 1   • hydroCHLOROthiazide (HYDRODIURIL) 12.5 MG tablet TAKE 1 TABLET BY MOUTH DAILY 30 tablet 0   • ibuprofen (ADVIL,MOTRIN) 600 MG tablet TAKE 1 TABLET BY MOUTH EVERY 6 HOURS WITH FOOD OR MILK AS NEEDED     • Sodium Fluoride 5000 PPM 1.1 % paste      • vitamin D (ERGOCALCIFEROL) 1.25 MG (59266 UT) capsule capsule Take 1 capsule by mouth 1 (One) Time Per Week. 13 capsule 1     No current facility-administered medications on file prior to visit.       No Known Allergies  Past Medical History:   Diagnosis Date   • Anxiety    • Depression    • Hypertension     during pregnancy     History reviewed. No pertinent surgical history.  Social History     Socioeconomic History   • Marital status:    Tobacco Use   • Smoking status: Former     Packs/day: 0.25     Years: 10.00     Pack years: 2.50     Types: Cigarettes     Quit date: 2021     Years since quittin.6   • Smokeless tobacco: Never   Vaping Use   • Vaping Use: Every day   • Substances: Nicotine, Flavoring   • Devices: Disposable   Substance and Sexual Activity   • Alcohol use: Never   • Drug use: Never   • Sexual activity: Defer     Family History   Problem Relation Age of Onset   • French Parkinson White syndrome Mother    • Hypertension Father    • Diabetes Paternal Grandmother      Immunization History   Administered Date(s) Administered   • COVID-19 (PFIZER) PURPLE CAP 2021, 2021   • Hepatitis B Vaccine Adult IM 2003, 10/17/2003   • MMR 1999   • Td 2003   • Tdap 2016       Tobacco Use: Medium Risk   • Smoking Tobacco Use: Former   • Smokeless Tobacco Use: Never   • Passive Exposure: Not on file       Objective     Physical Exam  Vitals and nursing note reviewed.   Constitutional:       Appearance:  Normal appearance. She is normal weight.   HENT:      Head: Normocephalic.      Nose: Nose normal.      Mouth/Throat:      Mouth: Mucous membranes are moist.   Eyes:      Pupils: Pupils are equal, round, and reactive to light.   Cardiovascular:      Rate and Rhythm: Normal rate and regular rhythm.      Pulses: Normal pulses.      Heart sounds: Normal heart sounds.   Pulmonary:      Effort: Pulmonary effort is normal.      Breath sounds: Normal breath sounds.   Abdominal:      General: Bowel sounds are normal.      Palpations: Abdomen is soft.   Musculoskeletal:         General: Normal range of motion.      Cervical back: Normal range of motion and neck supple.   Skin:     General: Skin is warm and dry.      Capillary Refill: Capillary refill takes less than 2 seconds.   Neurological:      General: No focal deficit present.      Mental Status: She is alert and oriented to person, place, and time.   Psychiatric:         Mood and Affect: Mood normal.         Behavior: Behavior normal.         Thought Content: Thought content normal.         Judgment: Judgment normal.         Vitals:    12/08/22 1526   BP: 126/76   Pulse: 99   Resp: 16   Temp: 97.9 °F (36.6 °C)   TempSrc: Temporal   SpO2: 100%   Weight: 64.8 kg (142 lb 13.7 oz)   PainSc: 0-No pain       Wt Readings from Last 3 Encounters:   12/08/22 64.8 kg (142 lb 13.7 oz)   10/14/22 64.4 kg (142 lb)   03/30/22 67.1 kg (148 lb)          ECOG score: 0         ECOG: (0) Fully Active - Able to Carry On All Pre-disease Performance Without Restriction  Fall Risk Assessment was completed, and patient is at low risk for falls.  PHQ-9 Total Score: 0       The patient is  experiencing fatigue. Fatigue score: 0    PT/OT Functional Screening: PT fx screen: No needs identified  Speech Functional Screening: Speech fx screen: No needs identified  Rehab to be ordered: Rehab to be ordered: No needs identified        Result Review :  The following data was reviewed by: Monika RENDON  MiriamPASTOR on 12/08/2022:  Lab Results   Component Value Date    HGB 9.4 (L) 10/14/2022    HCT 30.8 (L) 10/14/2022    MCV 72.0 (L) 10/14/2022     (H) 10/14/2022    WBC 4.81 10/14/2022    NEUTROABS 2.83 10/14/2022    LYMPHSABS 1.75 06/21/2021    MONOSABS 1.11 (H) 06/21/2021    EOSABS 0.15 10/14/2022    BASOSABS 0.05 10/14/2022     Lab Results   Component Value Date    GLUCOSE 88 10/14/2022    BUN 8 10/14/2022    CREATININE 0.85 10/14/2022     10/14/2022    K 4.3 10/14/2022     10/14/2022    CO2 24.5 10/14/2022    CALCIUM 9.0 10/14/2022    PROTEINTOT 6.9 10/14/2022    ALBUMIN 4.30 10/14/2022    BILITOT 0.2 10/14/2022    ALKPHOS 66 10/14/2022    AST 22 10/14/2022    ALT 10 10/14/2022       No Images in the past 120 days found..         Assessment and Plan:  Diagnoses and all orders for this visit:    1. Iron deficiency anemia, unspecified iron deficiency anemia type (Primary)  -     CBC & Differential  -     Iron Profile  -     Ferritin  -     Peripheral Blood Smear  -     Reticulocytes  -     Vitamin B12  -     Folate  -     Hemoglobinopathy Fractionation Cascade  -     CBC & Differential; Future  -     Iron Profile; Future  -     Ferritin; Future    2. Microcytic anemia  -     CBC & Differential  -     Iron Profile  -     Ferritin  -     Peripheral Blood Smear  -     Reticulocytes  -     Vitamin B12  -     Folate  -     Hemoglobinopathy Fractionation Cascade    3. Menorrhagia with regular cycle  -     CBC & Differential  -     Iron Profile  -     Ferritin  -     Peripheral Blood Smear  -     Reticulocytes  -     Vitamin B12  -     Folate  -     Hemoglobinopathy Fractionation Cascade      Microcytic anemia with iron deficiency anemia noted as well. She has thrombocytosis on recent labs which is likely related to the iron deficiency anemia. History of heavy menses since starting menstrual cycles. History of uterine fibroid on CT scan from 6/21/2021. We discussed to follow up with GYN to see if  they could place her on oral contraception to lessen the effects of the heavy menses which would help the MANUELA. She is not quite sure about an ablation as she may be anticipating future pregnancies. She has a 16 year old child.     We discussed side effects of iron infusions to include swelling, hypersensitivity reactions to include cough, SOA, flushing, itching and she will obtain premeds prior to iron infusion. She is agreeable to plan of care.     Will recheck labs today including CBC, iron panel, ferritin, folate, B-12, hemoglobin electrophoresis since she has microcytic anemia as well. She has never been told she has sickle cell trait or thalassemia.     She will follow up 1 month after iron infusions with lab check 1-2 days prior to 2 month follow up to see if iron levels have been repleted.     I spent 30 minutes caring for Madeline on this date of service. This time includes time spent by me in the following activities:preparing for the visit, reviewing tests, obtaining and/or reviewing a separately obtained history, performing a medically appropriate examination and/or evaluation , counseling and educating the patient/family/caregiver, ordering medications, tests, or procedures, referring and communicating with other health care professionals , documenting information in the medical record and independently interpreting results and communicating that information with the patient/family/caregiver    Patient Follow Up: 2 months with labs 1-2 days prior.     Patient was given instructions and counseling regarding her condition or for health maintenance advice. Please see specific information pulled into the AVS if appropriate.     Monika Pineda, APRN    12/8/2022    .[email or web address]

## 2022-12-09 LAB
FOLATE SERPL-MCNC: >20 NG/ML (ref 4.78–24.2)
VIT B12 BLD-MCNC: 530 PG/ML (ref 211–946)

## 2022-12-12 LAB
CYTO UR: NORMAL
HGB A MFR BLD ELPH: NORMAL % (ref 96.4–98.8)
HGB A MFR BLD HPLC: 62.1 % (ref 96.4–98.8)
HGB A2 MFR BLD ELPH: NORMAL % (ref 1.8–3.2)
HGB A2 MFR BLD HPLC: 2.9 % (ref 1.8–3.2)
HGB C MFR BLD HPLC: 33.8 %
HGB E MFR BLD HPLC: 0 %
HGB F MFR BLD ELPH: NORMAL % (ref 0–2)
HGB F MFR BLD HPLC: 1.2 % (ref 0–2)
HGB FRACT BLD-IMP: ABNORMAL
HGB FRACT BLD-IMP: NORMAL
HGB S MFR BLD ELPH: NORMAL %
HGB S MFR BLD HPLC: 0 %
LAB AP CASE REPORT: NORMAL
LAB AP CLINICAL INFORMATION: NORMAL
PATH REPORT.FINAL DX SPEC: NORMAL
PATH REPORT.GROSS SPEC: NORMAL

## 2022-12-27 RX ORDER — LABETALOL 100 MG/1
TABLET, FILM COATED ORAL
Qty: 60 TABLET | Refills: 2 | OUTPATIENT
Start: 2022-12-27

## 2023-02-09 ENCOUNTER — LAB (OUTPATIENT)
Dept: ONCOLOGY | Facility: HOSPITAL | Age: 33
End: 2023-02-09
Payer: COMMERCIAL

## 2023-02-09 DIAGNOSIS — D50.9 IRON DEFICIENCY ANEMIA, UNSPECIFIED IRON DEFICIENCY ANEMIA TYPE: ICD-10-CM

## 2023-02-09 LAB
ANISOCYTOSIS BLD QL: NORMAL
BASOPHILS # BLD AUTO: 0.07 10*3/MM3 (ref 0–0.2)
BASOPHILS NFR BLD AUTO: 0.9 % (ref 0–1.5)
DEPRECATED RDW RBC AUTO: 48.3 FL (ref 37–54)
EOSINOPHIL # BLD AUTO: 0.23 10*3/MM3 (ref 0–0.4)
EOSINOPHIL NFR BLD AUTO: 2.9 % (ref 0.3–6.2)
ERYTHROCYTE [DISTWIDTH] IN BLOOD BY AUTOMATED COUNT: 20.6 % (ref 12.3–15.4)
FERRITIN SERPL-MCNC: 6.83 NG/ML (ref 13–150)
HCT VFR BLD AUTO: 29.1 % (ref 34–46.6)
HGB BLD-MCNC: 9.9 G/DL (ref 12–15.9)
IMM GRANULOCYTES # BLD AUTO: 0.03 10*3/MM3 (ref 0–0.05)
IMM GRANULOCYTES NFR BLD AUTO: 0.4 % (ref 0–0.5)
IRON 24H UR-MRATE: 36 MCG/DL (ref 37–145)
IRON SATN MFR SERPL: 7 % (ref 20–50)
LYMPHOCYTES # BLD AUTO: 2.82 10*3/MM3 (ref 0.7–3.1)
LYMPHOCYTES NFR BLD AUTO: 35.3 % (ref 19.6–45.3)
MCH RBC QN AUTO: 22.8 PG (ref 26.6–33)
MCHC RBC AUTO-ENTMCNC: 34 G/DL (ref 31.5–35.7)
MCV RBC AUTO: 66.9 FL (ref 79–97)
MICROCYTES BLD QL: NORMAL
MONOCYTES # BLD AUTO: 0.82 10*3/MM3 (ref 0.1–0.9)
MONOCYTES NFR BLD AUTO: 10.3 % (ref 5–12)
NEUTROPHILS NFR BLD AUTO: 4.01 10*3/MM3 (ref 1.7–7)
NEUTROPHILS NFR BLD AUTO: 50.2 % (ref 42.7–76)
NRBC BLD AUTO-RTO: 0 /100 WBC (ref 0–0.2)
PLATELET # BLD AUTO: 312 10*3/MM3 (ref 140–450)
PMV BLD AUTO: 9.6 FL (ref 6–12)
POLYCHROMASIA BLD QL SMEAR: NORMAL
RBC # BLD AUTO: 4.35 10*6/MM3 (ref 3.77–5.28)
SMALL PLATELETS BLD QL SMEAR: ADEQUATE
TARGETS BLD QL SMEAR: NORMAL
TIBC SERPL-MCNC: 536 MCG/DL (ref 298–536)
TRANSFERRIN SERPL-MCNC: 360 MG/DL (ref 200–360)
WBC MORPH BLD: NORMAL
WBC NRBC COR # BLD: 7.98 10*3/MM3 (ref 3.4–10.8)

## 2023-02-09 PROCEDURE — 85025 COMPLETE CBC W/AUTO DIFF WBC: CPT

## 2023-02-09 PROCEDURE — 83540 ASSAY OF IRON: CPT

## 2023-02-09 PROCEDURE — 82728 ASSAY OF FERRITIN: CPT

## 2023-02-09 PROCEDURE — 36415 COLL VENOUS BLD VENIPUNCTURE: CPT

## 2023-02-09 PROCEDURE — 85007 BL SMEAR W/DIFF WBC COUNT: CPT

## 2023-02-09 PROCEDURE — 84466 ASSAY OF TRANSFERRIN: CPT

## 2023-02-10 ENCOUNTER — OFFICE VISIT (OUTPATIENT)
Dept: ONCOLOGY | Facility: HOSPITAL | Age: 33
End: 2023-02-10
Payer: COMMERCIAL

## 2023-02-10 VITALS
HEIGHT: 60 IN | SYSTOLIC BLOOD PRESSURE: 137 MMHG | DIASTOLIC BLOOD PRESSURE: 84 MMHG | WEIGHT: 153.22 LBS | HEART RATE: 109 BPM | OXYGEN SATURATION: 100 % | BODY MASS INDEX: 30.08 KG/M2 | RESPIRATION RATE: 16 BRPM | TEMPERATURE: 97.3 F

## 2023-02-10 DIAGNOSIS — K90.9 MALABSORPTION OF IRON: ICD-10-CM

## 2023-02-10 DIAGNOSIS — D50.9 IRON DEFICIENCY ANEMIA, UNSPECIFIED IRON DEFICIENCY ANEMIA TYPE: Primary | ICD-10-CM

## 2023-02-10 PROCEDURE — 99214 OFFICE O/P EST MOD 30 MIN: CPT | Performed by: INTERNAL MEDICINE

## 2023-02-10 PROCEDURE — G0463 HOSPITAL OUTPT CLINIC VISIT: HCPCS | Performed by: INTERNAL MEDICINE

## 2023-02-10 RX ORDER — SODIUM CHLORIDE 9 MG/ML
250 INJECTION, SOLUTION INTRAVENOUS ONCE
OUTPATIENT
Start: 2023-02-10

## 2023-02-10 RX ORDER — SODIUM CHLORIDE 9 MG/ML
250 INJECTION, SOLUTION INTRAVENOUS ONCE
OUTPATIENT
Start: 2023-02-24

## 2023-02-10 RX ORDER — SODIUM CHLORIDE 9 MG/ML
250 INJECTION, SOLUTION INTRAVENOUS ONCE
OUTPATIENT
Start: 2023-03-03

## 2023-02-10 RX ORDER — ACETAMINOPHEN 325 MG/1
650 TABLET ORAL ONCE
OUTPATIENT
Start: 2023-02-24

## 2023-02-10 RX ORDER — ACETAMINOPHEN 325 MG/1
650 TABLET ORAL ONCE
OUTPATIENT
Start: 2023-03-03

## 2023-02-10 RX ORDER — ACETAMINOPHEN 325 MG/1
650 TABLET ORAL ONCE
OUTPATIENT
Start: 2023-02-10

## 2023-02-10 RX ORDER — SODIUM CHLORIDE 9 MG/ML
250 INJECTION, SOLUTION INTRAVENOUS ONCE
OUTPATIENT
Start: 2023-02-17

## 2023-02-10 RX ORDER — ACETAMINOPHEN 325 MG/1
650 TABLET ORAL ONCE
OUTPATIENT
Start: 2023-02-17

## 2023-02-10 NOTE — PROGRESS NOTES
Chief Complaint/Reason for Referral:  Iron deficiency anemia      Adriano Curiel, *  Adriano Curiel, APRN    Records Obtained:  Records of the patients history including those obtained from  [facility] and patient information were reviewed and summarized in detail.    Madeline Veliz presents to [facility] for iron deficinency anemia    Subjective  History of present illness: Ms. Madeline Veliz presents for follow up iron  She has heavy menses since she started her menstrual cycles which last from 5-7 days and reports all the days are heavy. Also has depression, anxiety, anemia, insomnia, vitamin D deficiency, history of uterine fibroids. Former smoker of 1 pack every 2-3 days and quit. She now uses Vape. Denies any alcohol use. Not able to tolerate oral iron. No bleeding otherwise. Breathing is good. No fevers, chills, infections. She is fatigued daily.       Hematology History    10/14/2022: Iron 20, ferritin 13, iron sat of 4%, TIBC 514, WBC 4.81, hemoglobin 9.4. MCV 72.0, platelet count 466.    12/8/22: Hgb electrophoresis: 62% HbA, 34% HbC, no sickle cell. Iron sat 6%, ferritin 5, iron 31. Hgb 9.7    2/9/23: Iron 36, iron sat 7%, ferritin 6.8, TIBC 536. Hgb 9.9        Oncology/Hematology History    No history exists.       Review of Systems   Constitutional: Positive for fatigue.   HENT: Negative.    Eyes: Negative.    Respiratory: Negative.    Cardiovascular: Negative.    Gastrointestinal: Negative.    Endocrine: Negative.    Genitourinary: Positive for vaginal bleeding.   Musculoskeletal: Negative.    Allergic/Immunologic: Negative.    Neurological: Negative.    Hematological: Negative.    Psychiatric/Behavioral: Negative.    All other systems reviewed and are negative.      Current Outpatient Medications on File Prior to Visit   Medication Sig Dispense Refill   • Diclofenac Sodium (VOLTAREN) 1 % gel gel Apply 4 g topically to the appropriate area as  directed 4 (Four) Times a Day As Needed (knee pain). 100 g 2   • ferrous gluconate (FERGON) 324 MG tablet Take 1 tablet by mouth 2 (Two) Times a Day. 180 tablet 1   • hydroCHLOROthiazide (HYDRODIURIL) 12.5 MG tablet TAKE 1 TABLET BY MOUTH DAILY 30 tablet 0   • ibuprofen (ADVIL,MOTRIN) 600 MG tablet TAKE 1 TABLET BY MOUTH EVERY 6 HOURS WITH FOOD OR MILK AS NEEDED     • Sodium Fluoride 5000 PPM 1.1 % paste      • vitamin D (ERGOCALCIFEROL) 1.25 MG (28061 UT) capsule capsule Take 1 capsule by mouth 1 (One) Time Per Week. 13 capsule 1     No current facility-administered medications on file prior to visit.       No Known Allergies  Past Medical History:   Diagnosis Date   • Anxiety    • Depression    • Hypertension     during pregnancy     History reviewed. No pertinent surgical history.  Social History     Socioeconomic History   • Marital status:    Tobacco Use   • Smoking status: Former     Packs/day: 0.25     Years: 10.00     Pack years: 2.50     Types: Cigarettes     Quit date: 2021     Years since quittin.7   • Smokeless tobacco: Never   Vaping Use   • Vaping Use: Every day   • Substances: Nicotine, Flavoring   • Devices: Disposable   Substance and Sexual Activity   • Alcohol use: Never   • Drug use: Never   • Sexual activity: Defer     Family History   Problem Relation Age of Onset   • French Parkinson White syndrome Mother    • Hypertension Father    • Diabetes Paternal Grandmother      Immunization History   Administered Date(s) Administered   • COVID-19 (PFIZER) PURPLE CAP 2021, 2021   • Hepatitis B Vaccine Adult IM 2003, 10/17/2003   • MMR 1999   • Td 2003   • Tdap 2016       Tobacco Use: Medium Risk   • Smoking Tobacco Use: Former   • Smokeless Tobacco Use: Never   • Passive Exposure: Not on file       Objective     Physical Exam  Vitals and nursing note reviewed.   Constitutional:       Appearance: Normal appearance. She is normal weight.   HENT:      Head:  "Normocephalic.      Nose: Nose normal.      Mouth/Throat:      Mouth: Mucous membranes are moist.   Eyes:      Pupils: Pupils are equal, round, and reactive to light.   Pulmonary:      Effort: Pulmonary effort is normal.   Musculoskeletal:         General: Normal range of motion.      Cervical back: Normal range of motion and neck supple.   Skin:     General: Skin is warm and dry.   Neurological:      General: No focal deficit present.      Mental Status: She is alert and oriented to person, place, and time.   Psychiatric:         Mood and Affect: Mood normal.         Behavior: Behavior normal.         Thought Content: Thought content normal.         Judgment: Judgment normal.         Vitals:    02/10/23 1127   BP: 137/84   Pulse: 109   Resp: 16   Temp: 97.3 °F (36.3 °C)   SpO2: 100%   Weight: 69.5 kg (153 lb 3.5 oz)   Height: 152.4 cm (60\")   PainSc: 0-No pain       Wt Readings from Last 3 Encounters:   12/08/22 64.8 kg (142 lb 13.7 oz)   10/14/22 64.4 kg (142 lb)   03/30/22 67.1 kg (148 lb)                    ECOG: (0) Fully Active - Able to Carry On All Pre-disease Performance Without Restriction  Fall Risk Assessment was completed, and patient is at low risk for falls.  PHQ-9 Total Score:         The patient is  experiencing fatigue. Fatigue score: 0    PT/OT Functional Screening: PT fx screen: No needs identified  Speech Functional Screening: Speech fx screen: No needs identified  Rehab to be ordered: Rehab to be ordered: No needs identified        Result Review :  The following data was reviewed by: Adam Renae MD on 02/10/23:  Lab Results   Component Value Date    HGB 9.9 (L) 02/09/2023    HCT 29.1 (L) 02/09/2023    MCV 66.9 (L) 02/09/2023     02/09/2023    WBC 7.98 02/09/2023    NEUTROABS 4.01 02/09/2023    LYMPHSABS 2.82 02/09/2023    MONOSABS 0.82 02/09/2023    EOSABS 0.23 02/09/2023    BASOSABS 0.07 02/09/2023     Lab Results   Component Value Date    GLUCOSE 88 10/14/2022    BUN 8 " 10/14/2022    CREATININE 0.85 10/14/2022     10/14/2022    K 4.3 10/14/2022     10/14/2022    CO2 24.5 10/14/2022    CALCIUM 9.0 10/14/2022    PROTEINTOT 6.9 10/14/2022    ALBUMIN 4.30 10/14/2022    BILITOT 0.2 10/14/2022    ALKPHOS 66 10/14/2022    AST 22 10/14/2022    ALT 10 10/14/2022       Labs reviewed. Anemia present. Iron low.         Assessment and Plan:  Diagnoses and all orders for this visit:    1. Iron deficiency anemia, unspecified iron deficiency anemia type (Primary)  -     Iron Profile; Future  -     CBC & Differential; Future  -     Ferritin; Future  -     ONCBCN INFUSION APPOINTMENT REQUEST 01; Future  -     ONCBCN INFUSION APPOINTMENT REQUEST 01; Future  -     ONCBCN INFUSION APPOINTMENT REQUEST 01; Future  -     ONCBCN INFUSION APPOINTMENT REQUEST 01; Future    2. Malabsorption of iron  -     ONCBCN INFUSION APPOINTMENT REQUEST 01; Future  -     ONCBCN INFUSION APPOINTMENT REQUEST 01; Future  -     ONCBCN INFUSION APPOINTMENT REQUEST 01; Future  -     ONCBCN INFUSION APPOINTMENT REQUEST 01; Future    Other orders  -     sodium chloride 0.9 % infusion 250 mL  -     acetaminophen (TYLENOL) tablet 650 mg  -     diphenhydrAMINE (BENADRYL) 25 mg in sodium chloride 0.9 % 50 mL IVPB  -     ferric gluconate (FERRLECIT) 250 mg in sodium chloride 0.9 % 270 mL IVPB  -     sodium chloride 0.9 % infusion 250 mL  -     acetaminophen (TYLENOL) tablet 650 mg  -     diphenhydrAMINE (BENADRYL) 25 mg in sodium chloride 0.9 % 50 mL IVPB  -     ferric gluconate (FERRLECIT) 250 mg in sodium chloride 0.9 % 270 mL IVPB  -     sodium chloride 0.9 % infusion 250 mL  -     acetaminophen (TYLENOL) tablet 650 mg  -     diphenhydrAMINE (BENADRYL) 25 mg in sodium chloride 0.9 % 50 mL IVPB  -     ferric gluconate (FERRLECIT) 250 mg in sodium chloride 0.9 % 270 mL IVPB  -     sodium chloride 0.9 % infusion 250 mL  -     acetaminophen (TYLENOL) tablet 650 mg  -     diphenhydrAMINE (BENADRYL) 25 mg in sodium chloride  0.9 % 50 mL IVPB  -     ferric gluconate (FERRLECIT) 250 mg in sodium chloride 0.9 % 270 mL IVPB      Microcytic anemia with iron deficiency anemia noted as well.  History of heavy menses since starting menstrual cycles as likely soruce. History of uterine fibroid on CT scan from 6/21/2021. We discussed to follow up with GYN to see if they could place her on oral contraception to lessen the effects of the heavy menses which would help the MANUELA. She is not quite sure about an ablation as she may be anticipating future pregnancies. She has a 16 year old child.     IV iron infusions have been scheduled. Plan to repeat CBC and iron studies in 3 months to monitor improvement.    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 Likely has no clinical significance as she does not have any thalassemia or sickle cell disease concurrent with it.       I spent 30 minutes caring for Madeline on this date of service. This time includes time spent by me in the following activities:preparing for the visit, reviewing tests, obtaining and/or reviewing a separately obtained history, performing a medically appropriate examination and/or evaluation , counseling and educating the patient/family/caregiver, ordering medications, tests, or procedures, referring and communicating with other health care professionals , documenting information in the medical record and independently interpreting results and communicating that information with the patient/family/caregiver    Patient Follow Up: 3 months    Patient was given instructions and counseling regarding her condition or for health maintenance advice. Please see specific information pulled into the AVS if appropriate.

## 2023-04-12 ENCOUNTER — HOSPITAL ENCOUNTER (OUTPATIENT)
Dept: ONCOLOGY | Facility: HOSPITAL | Age: 33
Discharge: HOME | End: 2023-04-12
Admitting: INTERNAL MEDICINE
Payer: COMMERCIAL

## 2023-04-12 VITALS
SYSTOLIC BLOOD PRESSURE: 135 MMHG | DIASTOLIC BLOOD PRESSURE: 84 MMHG | TEMPERATURE: 98.4 F | OXYGEN SATURATION: 98 % | HEART RATE: 99 BPM | RESPIRATION RATE: 15 BRPM

## 2023-04-12 DIAGNOSIS — D50.9 IRON DEFICIENCY ANEMIA, UNSPECIFIED IRON DEFICIENCY ANEMIA TYPE: Primary | ICD-10-CM

## 2023-04-12 DIAGNOSIS — K90.9 MALABSORPTION OF IRON: ICD-10-CM

## 2023-04-12 PROCEDURE — 25010000002 NA FERRIC GLUC CPLX PER 12.5 MG: Performed by: INTERNAL MEDICINE

## 2023-04-12 PROCEDURE — 96366 THER/PROPH/DIAG IV INF ADDON: CPT

## 2023-04-12 PROCEDURE — 96375 TX/PRO/DX INJ NEW DRUG ADDON: CPT

## 2023-04-12 PROCEDURE — 96365 THER/PROPH/DIAG IV INF INIT: CPT

## 2023-04-12 PROCEDURE — 25010000002 DIPHENHYDRAMINE PER 50 MG: Performed by: INTERNAL MEDICINE

## 2023-04-12 RX ORDER — SODIUM CHLORIDE 9 MG/ML
250 INJECTION, SOLUTION INTRAVENOUS ONCE
Status: COMPLETED | OUTPATIENT
Start: 2023-04-12 | End: 2023-04-12

## 2023-04-12 RX ORDER — ACETAMINOPHEN 325 MG/1
650 TABLET ORAL ONCE
Status: COMPLETED | OUTPATIENT
Start: 2023-04-12 | End: 2023-04-12

## 2023-04-12 RX ADMIN — SODIUM CHLORIDE 250 ML: 9 INJECTION, SOLUTION INTRAVENOUS at 13:40

## 2023-04-12 RX ADMIN — SODIUM CHLORIDE 250 MG: 9 INJECTION, SOLUTION INTRAVENOUS at 14:03

## 2023-04-12 RX ADMIN — ACETAMINOPHEN 650 MG: 325 TABLET ORAL at 13:37

## 2023-04-12 RX ADMIN — DIPHENHYDRAMINE HYDROCHLORIDE 25 MG: 50 INJECTION, SOLUTION INTRAMUSCULAR; INTRAVENOUS at 13:41

## 2023-04-21 ENCOUNTER — OFFICE VISIT (OUTPATIENT)
Dept: FAMILY MEDICINE CLINIC | Facility: CLINIC | Age: 33
End: 2023-04-21
Payer: COMMERCIAL

## 2023-04-21 VITALS
SYSTOLIC BLOOD PRESSURE: 126 MMHG | HEIGHT: 60 IN | TEMPERATURE: 97.7 F | HEART RATE: 93 BPM | WEIGHT: 155 LBS | DIASTOLIC BLOOD PRESSURE: 86 MMHG | OXYGEN SATURATION: 100 % | BODY MASS INDEX: 30.43 KG/M2

## 2023-04-21 DIAGNOSIS — Z09 FOLLOW-UP EXAM, 3-6 MONTHS SINCE PREVIOUS EXAM: Primary | ICD-10-CM

## 2023-04-21 DIAGNOSIS — E66.9 CLASS 1 OBESITY WITH SERIOUS COMORBIDITY AND BODY MASS INDEX (BMI) OF 30.0 TO 30.9 IN ADULT, UNSPECIFIED OBESITY TYPE: ICD-10-CM

## 2023-04-21 DIAGNOSIS — I10 PRIMARY HYPERTENSION: ICD-10-CM

## 2023-04-21 RX ORDER — MAGNESIUM OXIDE 400 MG/1
400 TABLET ORAL DAILY
Qty: 90 TABLET | Refills: 1 | Status: SHIPPED | OUTPATIENT
Start: 2023-04-21

## 2023-04-21 NOTE — PROGRESS NOTES
"Chief Complaint  Follow-up (6M routine check up, pt is currently having Iron infusions)    Subjective        Medical History: has a past medical history of Anxiety, Depression, and Hypertension.     Surgical History: has no past surgical history on file.     Family History: family history includes Diabetes in her paternal grandmother; Hypertension in her father; French Parkinson White syndrome in her mother.     Social History: reports that she quit smoking about 1 years ago. Her smoking use included cigarettes. She has a 2.50 pack-year smoking history. She has never used smokeless tobacco. She reports that she does not drink alcohol and does not use drugs.    Madeline Veliz presents to [facility]  History of Present Illness  Hypertension  This is a recurrent problem. The current episode started more than 1 year ago. The problem has been waxing and waning since onset. The problem is controlled. Pertinent negatives include no anxiety, chest pain, headaches, malaise/fatigue, peripheral edema or shortness of breath. There are no associated agents to hypertension. Past treatments include beta blockers. Current antihypertension treatment includes diuretics. There are no compliance problems.  There is no history of angina, CVA or retinopathy.     Objective   Vital Signs:   /86 (BP Location: Left arm, Patient Position: Sitting, Cuff Size: Adult)   Pulse 93   Temp 97.7 °F (36.5 °C) (Temporal)   Ht 152.4 cm (60\")   Wt 70.3 kg (155 lb)   SpO2 100%   BMI 30.27 kg/m²       Wt Readings from Last 3 Encounters:   04/21/23 70.3 kg (155 lb)   02/10/23 69.5 kg (153 lb 3.5 oz)   12/08/22 64.8 kg (142 lb 13.7 oz)        BP Readings from Last 3 Encounters:   04/21/23 126/86   04/12/23 135/84   02/10/23 137/84        BMI is >= 25 and <30. (Overweight) The following options were offered after discussion;: weight loss educational material (shared in after visit summary)       Physical " Exam  Vitals reviewed.   Constitutional:       Appearance: Normal appearance. She is well-developed. She is obese.   HENT:      Head: Normocephalic and atraumatic.      Right Ear: External ear normal.      Left Ear: External ear normal.   Eyes:      Conjunctiva/sclera: Conjunctivae normal.      Pupils: Pupils are equal, round, and reactive to light.   Cardiovascular:      Rate and Rhythm: Normal rate and regular rhythm.      Heart sounds: No murmur heard.  Pulmonary:      Effort: Pulmonary effort is normal.      Breath sounds: Normal breath sounds. No wheezing or rhonchi.   Musculoskeletal:      Right lower leg: No edema.      Left lower leg: No edema.   Skin:     General: Skin is warm and dry.   Neurological:      Mental Status: She is alert and oriented to person, place, and time.   Psychiatric:         Mood and Affect: Mood and affect normal.         Behavior: Behavior normal.         Thought Content: Thought content normal.         Judgment: Judgment normal.        Result Review :  {The following data was reviewed by PASTOR Castaneda on 04/21/2023.  Common labs        10/14/2022    09:21 12/8/2022    16:10 2/9/2023    14:09   Common Labs   Glucose 88       BUN 8       Creatinine 0.85       Sodium 137       Potassium 4.3       Chloride 103       Calcium 9.0       Albumin 4.30       Total Bilirubin 0.2       Alkaline Phosphatase 66       AST (SGOT) 22       ALT (SGPT) 10       WBC 4.81   6.56   7.98     Hemoglobin 9.4   9.7   9.9     Hematocrit 30.8   29.2   29.1     Platelets 466   333   312     Total Cholesterol 181       Triglycerides 63       HDL Cholesterol 74       LDL Cholesterol  95         Data reviewed: Previous office note            Current Outpatient Medications on File Prior to Visit   Medication Sig Dispense Refill   • Diclofenac Sodium (VOLTAREN) 1 % gel gel Apply 4 g topically to the appropriate area as directed 4 (Four) Times a Day As Needed (knee pain). 100 g 2   • Sodium Fluoride 5000  PPM 1.1 % paste      • [DISCONTINUED] ferrous gluconate (FERGON) 324 MG tablet Take 1 tablet by mouth 2 (Two) Times a Day. (Patient not taking: Reported on 4/21/2023) 180 tablet 1   • [DISCONTINUED] hydroCHLOROthiazide (HYDRODIURIL) 12.5 MG tablet TAKE 1 TABLET BY MOUTH DAILY (Patient not taking: Reported on 4/21/2023) 30 tablet 0   • [DISCONTINUED] ibuprofen (ADVIL,MOTRIN) 600 MG tablet TAKE 1 TABLET BY MOUTH EVERY 6 HOURS WITH FOOD OR MILK AS NEEDED (Patient not taking: Reported on 4/21/2023)     • [DISCONTINUED] vitamin D (ERGOCALCIFEROL) 1.25 MG (54229 UT) capsule capsule Take 1 capsule by mouth 1 (One) Time Per Week. (Patient not taking: Reported on 4/21/2023) 13 capsule 1     No current facility-administered medications on file prior to visit.        Assessment and Plan  Diagnoses and all orders for this visit:    1. Follow-up exam, 3-6 months since previous exam (Primary)    2. Primary hypertension  Comments:  Patient is still maintaining her blood pressure with not currently on any medication.  Blood pressure today was 126/86, patient states she is going to start eating more healthy, she was given a handout to show how healthy eating, exercise, decrease salt can bring down the blood pressure.  Patient does have an appointment coming up with her OB, and she is seeing hematology for her iron deficiency anemia.  We will continue to monitor patient's blood pressure she is to follow-up in 6 months for her annual physical exam.  Discussed with patient return precautions, patient verbalized understanding agreeable treatment plan.    3. Class 1 obesity with serious comorbidity and body mass index (BMI) of 30.0 to 30.9 in adult, unspecified obesity type    Other orders  -     magnesium oxide (MAG-OX) 400 MG tablet; Take 1 tablet by mouth Daily.  Dispense: 90 tablet; Refill: 1        Follow Up   Return in about 6 months (around 10/21/2023) for Annual physical.  Patient was given instructions and counseling regarding  her condition or for health maintenance advice. Please see specific information pulled into the AVS if appropriate.       Part of this note may be electronic transcription/translation of spoken language to printed text using the Dragon dictation system

## 2023-04-27 ENCOUNTER — HOSPITAL ENCOUNTER (OUTPATIENT)
Dept: ONCOLOGY | Facility: HOSPITAL | Age: 33
Discharge: HOME | End: 2023-04-27
Payer: COMMERCIAL

## 2023-04-27 VITALS — TEMPERATURE: 98.1 F | HEART RATE: 102 BPM | OXYGEN SATURATION: 99 % | RESPIRATION RATE: 17 BRPM

## 2023-04-27 DIAGNOSIS — K90.9 MALABSORPTION OF IRON: ICD-10-CM

## 2023-04-27 DIAGNOSIS — D50.9 IRON DEFICIENCY ANEMIA, UNSPECIFIED IRON DEFICIENCY ANEMIA TYPE: Primary | ICD-10-CM

## 2023-04-27 PROCEDURE — 96375 TX/PRO/DX INJ NEW DRUG ADDON: CPT

## 2023-04-27 PROCEDURE — 25010000002 DIPHENHYDRAMINE PER 50 MG: Performed by: INTERNAL MEDICINE

## 2023-04-27 PROCEDURE — 96366 THER/PROPH/DIAG IV INF ADDON: CPT

## 2023-04-27 PROCEDURE — 96365 THER/PROPH/DIAG IV INF INIT: CPT

## 2023-04-27 PROCEDURE — 25010000002 NA FERRIC GLUC CPLX PER 12.5 MG: Performed by: INTERNAL MEDICINE

## 2023-04-27 RX ORDER — SODIUM CHLORIDE 9 MG/ML
250 INJECTION, SOLUTION INTRAVENOUS ONCE
Status: COMPLETED | OUTPATIENT
Start: 2023-04-27 | End: 2023-04-27

## 2023-04-27 RX ORDER — ACETAMINOPHEN 325 MG/1
650 TABLET ORAL ONCE
Status: COMPLETED | OUTPATIENT
Start: 2023-04-27 | End: 2023-04-27

## 2023-04-27 RX ADMIN — SODIUM CHLORIDE 250 MG: 9 INJECTION, SOLUTION INTRAVENOUS at 13:57

## 2023-04-27 RX ADMIN — SODIUM CHLORIDE 250 ML: 9 INJECTION, SOLUTION INTRAVENOUS at 13:37

## 2023-04-27 RX ADMIN — DIPHENHYDRAMINE HYDROCHLORIDE 25 MG: 50 INJECTION, SOLUTION INTRAMUSCULAR; INTRAVENOUS at 13:38

## 2023-04-27 RX ADMIN — ACETAMINOPHEN 650 MG: 325 TABLET ORAL at 13:36

## 2023-05-11 ENCOUNTER — HOSPITAL ENCOUNTER (OUTPATIENT)
Dept: ONCOLOGY | Facility: HOSPITAL | Age: 33
Discharge: HOME | End: 2023-05-11
Admitting: INTERNAL MEDICINE
Payer: COMMERCIAL

## 2023-05-11 VITALS
HEART RATE: 94 BPM | DIASTOLIC BLOOD PRESSURE: 93 MMHG | SYSTOLIC BLOOD PRESSURE: 134 MMHG | OXYGEN SATURATION: 100 % | RESPIRATION RATE: 18 BRPM | TEMPERATURE: 98.2 F

## 2023-05-11 DIAGNOSIS — D50.9 IRON DEFICIENCY ANEMIA, UNSPECIFIED IRON DEFICIENCY ANEMIA TYPE: Primary | ICD-10-CM

## 2023-05-11 DIAGNOSIS — K90.9 MALABSORPTION OF IRON: ICD-10-CM

## 2023-05-11 PROCEDURE — 25010000002 NA FERRIC GLUC CPLX PER 12.5 MG: Performed by: INTERNAL MEDICINE

## 2023-05-11 PROCEDURE — 96375 TX/PRO/DX INJ NEW DRUG ADDON: CPT

## 2023-05-11 PROCEDURE — 96365 THER/PROPH/DIAG IV INF INIT: CPT

## 2023-05-11 PROCEDURE — 96366 THER/PROPH/DIAG IV INF ADDON: CPT

## 2023-05-11 PROCEDURE — 25010000002 DIPHENHYDRAMINE PER 50 MG: Performed by: INTERNAL MEDICINE

## 2023-05-11 RX ORDER — SODIUM CHLORIDE 9 MG/ML
250 INJECTION, SOLUTION INTRAVENOUS ONCE
Status: COMPLETED | OUTPATIENT
Start: 2023-05-11 | End: 2023-05-11

## 2023-05-11 RX ORDER — ACETAMINOPHEN 325 MG/1
650 TABLET ORAL ONCE
Status: COMPLETED | OUTPATIENT
Start: 2023-05-11 | End: 2023-05-11

## 2023-05-11 RX ADMIN — SODIUM CHLORIDE 250 MG: 9 INJECTION, SOLUTION INTRAVENOUS at 14:00

## 2023-05-11 RX ADMIN — DIPHENHYDRAMINE HYDROCHLORIDE 25 MG: 50 INJECTION, SOLUTION INTRAMUSCULAR; INTRAVENOUS at 13:37

## 2023-05-11 RX ADMIN — SODIUM CHLORIDE 250 ML: 9 INJECTION, SOLUTION INTRAVENOUS at 14:00

## 2023-05-11 RX ADMIN — ACETAMINOPHEN 650 MG: 325 TABLET ORAL at 13:37

## 2023-05-18 ENCOUNTER — HOSPITAL ENCOUNTER (OUTPATIENT)
Dept: ONCOLOGY | Facility: HOSPITAL | Age: 33
Discharge: HOME | End: 2023-05-18
Payer: COMMERCIAL

## 2023-05-18 VITALS
HEART RATE: 94 BPM | OXYGEN SATURATION: 100 % | DIASTOLIC BLOOD PRESSURE: 87 MMHG | RESPIRATION RATE: 18 BRPM | TEMPERATURE: 98 F | SYSTOLIC BLOOD PRESSURE: 127 MMHG

## 2023-05-18 DIAGNOSIS — K90.9 MALABSORPTION OF IRON: ICD-10-CM

## 2023-05-18 DIAGNOSIS — D50.9 IRON DEFICIENCY ANEMIA, UNSPECIFIED IRON DEFICIENCY ANEMIA TYPE: Primary | ICD-10-CM

## 2023-05-18 PROCEDURE — 96365 THER/PROPH/DIAG IV INF INIT: CPT

## 2023-05-18 PROCEDURE — 96375 TX/PRO/DX INJ NEW DRUG ADDON: CPT

## 2023-05-18 PROCEDURE — 96366 THER/PROPH/DIAG IV INF ADDON: CPT

## 2023-05-18 PROCEDURE — 25010000002 DIPHENHYDRAMINE PER 50 MG: Performed by: INTERNAL MEDICINE

## 2023-05-18 PROCEDURE — 25010000002 NA FERRIC GLUC CPLX PER 12.5 MG: Performed by: INTERNAL MEDICINE

## 2023-05-18 RX ORDER — ACETAMINOPHEN 325 MG/1
650 TABLET ORAL ONCE
Status: COMPLETED | OUTPATIENT
Start: 2023-05-18 | End: 2023-05-18

## 2023-05-18 RX ORDER — SODIUM CHLORIDE 9 MG/ML
250 INJECTION, SOLUTION INTRAVENOUS ONCE
Status: COMPLETED | OUTPATIENT
Start: 2023-05-18 | End: 2023-05-18

## 2023-05-18 RX ADMIN — SODIUM CHLORIDE 250 MG: 9 INJECTION, SOLUTION INTRAVENOUS at 11:01

## 2023-05-18 RX ADMIN — SODIUM CHLORIDE 250 ML: 9 INJECTION, SOLUTION INTRAVENOUS at 10:40

## 2023-05-18 RX ADMIN — ACETAMINOPHEN 650 MG: 325 TABLET ORAL at 10:43

## 2023-05-18 RX ADMIN — DIPHENHYDRAMINE HYDROCHLORIDE 25 MG: 50 INJECTION, SOLUTION INTRAMUSCULAR; INTRAVENOUS at 10:44

## 2023-06-16 ENCOUNTER — LAB (OUTPATIENT)
Dept: ONCOLOGY | Facility: HOSPITAL | Age: 33
End: 2023-06-16
Payer: COMMERCIAL

## 2023-06-16 DIAGNOSIS — D50.9 IRON DEFICIENCY ANEMIA, UNSPECIFIED IRON DEFICIENCY ANEMIA TYPE: ICD-10-CM

## 2023-06-16 LAB
BASOPHILS # BLD AUTO: 0.02 10*3/MM3 (ref 0–0.2)
BASOPHILS NFR BLD AUTO: 0.3 % (ref 0–1.5)
DEPRECATED RDW RBC AUTO: 64.4 FL (ref 37–54)
EOSINOPHIL # BLD AUTO: 0.2 10*3/MM3 (ref 0–0.4)
EOSINOPHIL NFR BLD AUTO: 2.5 % (ref 0.3–6.2)
ERYTHROCYTE [DISTWIDTH] IN BLOOD BY AUTOMATED COUNT: 22.7 % (ref 12.3–15.4)
FERRITIN SERPL-MCNC: 82.71 NG/ML (ref 13–150)
HCT VFR BLD AUTO: 37.5 % (ref 34–46.6)
HGB BLD-MCNC: 13 G/DL (ref 12–15.9)
IMM GRANULOCYTES # BLD AUTO: 0.01 10*3/MM3 (ref 0–0.05)
IMM GRANULOCYTES NFR BLD AUTO: 0.1 % (ref 0–0.5)
IRON 24H UR-MRATE: 83 MCG/DL (ref 37–145)
IRON SATN MFR SERPL: 23 % (ref 20–50)
LYMPHOCYTES # BLD AUTO: 3.23 10*3/MM3 (ref 0.7–3.1)
LYMPHOCYTES NFR BLD AUTO: 40.9 % (ref 19.6–45.3)
MCH RBC QN AUTO: 27.3 PG (ref 26.6–33)
MCHC RBC AUTO-ENTMCNC: 34.7 G/DL (ref 31.5–35.7)
MCV RBC AUTO: 78.6 FL (ref 79–97)
MONOCYTES # BLD AUTO: 0.71 10*3/MM3 (ref 0.1–0.9)
MONOCYTES NFR BLD AUTO: 9 % (ref 5–12)
NEUTROPHILS NFR BLD AUTO: 3.73 10*3/MM3 (ref 1.7–7)
NEUTROPHILS NFR BLD AUTO: 47.2 % (ref 42.7–76)
PLATELET # BLD AUTO: 234 10*3/MM3 (ref 140–450)
PMV BLD AUTO: 10.7 FL (ref 6–12)
RBC # BLD AUTO: 4.77 10*6/MM3 (ref 3.77–5.28)
TIBC SERPL-MCNC: 356 MCG/DL (ref 298–536)
TRANSFERRIN SERPL-MCNC: 239 MG/DL (ref 200–360)
WBC NRBC COR # BLD: 7.9 10*3/MM3 (ref 3.4–10.8)

## 2023-06-16 PROCEDURE — 83540 ASSAY OF IRON: CPT

## 2023-06-16 PROCEDURE — 84466 ASSAY OF TRANSFERRIN: CPT

## 2023-06-16 PROCEDURE — 36415 COLL VENOUS BLD VENIPUNCTURE: CPT

## 2023-06-16 PROCEDURE — 82728 ASSAY OF FERRITIN: CPT

## 2023-06-16 PROCEDURE — 85025 COMPLETE CBC W/AUTO DIFF WBC: CPT

## 2023-10-23 ENCOUNTER — OFFICE VISIT (OUTPATIENT)
Dept: FAMILY MEDICINE CLINIC | Facility: CLINIC | Age: 33
End: 2023-10-23
Payer: COMMERCIAL

## 2023-10-23 VITALS
SYSTOLIC BLOOD PRESSURE: 134 MMHG | HEART RATE: 75 BPM | DIASTOLIC BLOOD PRESSURE: 98 MMHG | OXYGEN SATURATION: 100 % | HEIGHT: 60 IN | BODY MASS INDEX: 31.53 KG/M2 | WEIGHT: 160.6 LBS | TEMPERATURE: 96.6 F

## 2023-10-23 DIAGNOSIS — E55.9 VITAMIN D DEFICIENCY: ICD-10-CM

## 2023-10-23 DIAGNOSIS — Z13.29 THYROID DISORDER SCREENING: ICD-10-CM

## 2023-10-23 DIAGNOSIS — D50.9 IRON DEFICIENCY ANEMIA, UNSPECIFIED IRON DEFICIENCY ANEMIA TYPE: ICD-10-CM

## 2023-10-23 DIAGNOSIS — Z13.220 LIPID SCREENING: ICD-10-CM

## 2023-10-23 DIAGNOSIS — Z00.00 ANNUAL PHYSICAL EXAM: Primary | ICD-10-CM

## 2023-10-23 DIAGNOSIS — I10 PRIMARY HYPERTENSION: ICD-10-CM

## 2023-10-23 DIAGNOSIS — E66.09 CLASS 1 OBESITY DUE TO EXCESS CALORIES WITH SERIOUS COMORBIDITY AND BODY MASS INDEX (BMI) OF 31.0 TO 31.9 IN ADULT: ICD-10-CM

## 2023-10-23 LAB
ALBUMIN SERPL-MCNC: 4.4 G/DL (ref 3.5–5.2)
ALBUMIN/GLOB SERPL: 2 G/DL
ALP SERPL-CCNC: 71 U/L (ref 39–117)
ALT SERPL W P-5'-P-CCNC: 12 U/L (ref 1–33)
ANION GAP SERPL CALCULATED.3IONS-SCNC: 8.5 MMOL/L (ref 5–15)
AST SERPL-CCNC: 19 U/L (ref 1–32)
BILIRUB SERPL-MCNC: <0.2 MG/DL (ref 0–1.2)
BUN SERPL-MCNC: 8 MG/DL (ref 6–20)
BUN/CREAT SERPL: 9.3 (ref 7–25)
CALCIUM SPEC-SCNC: 9.1 MG/DL (ref 8.6–10.5)
CHLORIDE SERPL-SCNC: 106 MMOL/L (ref 98–107)
CHOLEST SERPL-MCNC: 165 MG/DL (ref 0–200)
CO2 SERPL-SCNC: 24.5 MMOL/L (ref 22–29)
CREAT SERPL-MCNC: 0.86 MG/DL (ref 0.57–1)
DEPRECATED RDW RBC AUTO: 44.5 FL (ref 37–54)
EGFRCR SERPLBLD CKD-EPI 2021: 90.5 ML/MIN/1.73
ERYTHROCYTE [DISTWIDTH] IN BLOOD BY AUTOMATED COUNT: 15.4 % (ref 12.3–15.4)
GLOBULIN UR ELPH-MCNC: 2.2 GM/DL
GLUCOSE SERPL-MCNC: 96 MG/DL (ref 65–99)
HCT VFR BLD AUTO: 36.1 % (ref 34–46.6)
HDLC SERPL-MCNC: 53 MG/DL (ref 40–60)
HGB BLD-MCNC: 11.9 G/DL (ref 12–15.9)
LDLC SERPL CALC-MCNC: 102 MG/DL (ref 0–100)
LDLC/HDLC SERPL: 1.94 {RATIO}
MCH RBC QN AUTO: 26.6 PG (ref 26.6–33)
MCHC RBC AUTO-ENTMCNC: 33 G/DL (ref 31.5–35.7)
MCV RBC AUTO: 80.6 FL (ref 79–97)
PLATELET # BLD AUTO: 369 10*3/MM3 (ref 140–450)
PMV BLD AUTO: 11.4 FL (ref 6–12)
POTASSIUM SERPL-SCNC: 4.5 MMOL/L (ref 3.5–5.2)
PROT SERPL-MCNC: 6.6 G/DL (ref 6–8.5)
RBC # BLD AUTO: 4.48 10*6/MM3 (ref 3.77–5.28)
SODIUM SERPL-SCNC: 139 MMOL/L (ref 136–145)
TRIGL SERPL-MCNC: 47 MG/DL (ref 0–150)
TSH SERPL DL<=0.05 MIU/L-ACNC: 2.06 UIU/ML (ref 0.27–4.2)
VLDLC SERPL-MCNC: 10 MG/DL (ref 5–40)
WBC NRBC COR # BLD: 8.53 10*3/MM3 (ref 3.4–10.8)

## 2023-10-23 PROCEDURE — 85007 BL SMEAR W/DIFF WBC COUNT: CPT | Performed by: NURSE PRACTITIONER

## 2023-10-23 PROCEDURE — 80061 LIPID PANEL: CPT | Performed by: NURSE PRACTITIONER

## 2023-10-23 PROCEDURE — 80050 GENERAL HEALTH PANEL: CPT | Performed by: NURSE PRACTITIONER

## 2023-10-23 PROCEDURE — 82306 VITAMIN D 25 HYDROXY: CPT | Performed by: NURSE PRACTITIONER

## 2023-10-23 RX ORDER — LABETALOL 100 MG/1
100 TABLET, FILM COATED ORAL DAILY
Qty: 90 TABLET | Refills: 0
Start: 2023-10-23 | End: 2023-10-23

## 2023-10-23 RX ORDER — HYDROCHLOROTHIAZIDE 12.5 MG/1
12.5 TABLET ORAL DAILY
Qty: 90 TABLET | Refills: 1 | Status: SHIPPED | OUTPATIENT
Start: 2023-10-23
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
## 2023-10-24 LAB
25(OH)D3 SERPL-MCNC: 16.8 NG/ML (ref 30–100)
ANISOCYTOSIS BLD QL: ABNORMAL
BASOPHILS # BLD MANUAL: 0.18 10*3/MM3 (ref 0–0.2)
BASOPHILS NFR BLD MANUAL: 2.1 % (ref 0–1.5)
EOSINOPHIL # BLD MANUAL: 0.35 10*3/MM3 (ref 0–0.4)
EOSINOPHIL NFR BLD MANUAL: 4.1 % (ref 0.3–6.2)
LYMPHOCYTES # BLD MANUAL: 2.47 10*3/MM3 (ref 0.7–3.1)
LYMPHOCYTES NFR BLD MANUAL: 11.3 % (ref 5–12)
MICROCYTES BLD QL: ABNORMAL
MONOCYTES # BLD: 0.96 10*3/MM3 (ref 0.1–0.9)
NEUTROPHILS # BLD AUTO: 4.57 10*3/MM3 (ref 1.7–7)
NEUTROPHILS NFR BLD MANUAL: 53.6 % (ref 42.7–76)
PLAT MORPH BLD: NORMAL
VARIANT LYMPHS NFR BLD MANUAL: 28.9 % (ref 19.6–45.3)
WBC MORPH BLD: NORMAL

## 2023-10-25 ENCOUNTER — LAB (OUTPATIENT)
Dept: ONCOLOGY | Facility: HOSPITAL | Age: 33
End: 2023-10-25
Payer: COMMERCIAL

## 2023-10-25 DIAGNOSIS — D50.9 IRON DEFICIENCY ANEMIA, UNSPECIFIED IRON DEFICIENCY ANEMIA TYPE: ICD-10-CM

## 2023-10-25 LAB
BASOPHILS # BLD AUTO: 0.04 10*3/MM3 (ref 0–0.2)
BASOPHILS NFR BLD AUTO: 0.5 % (ref 0–1.5)
DEPRECATED RDW RBC AUTO: 44.3 FL (ref 37–54)
EOSINOPHIL # BLD AUTO: 0.31 10*3/MM3 (ref 0–0.4)
EOSINOPHIL NFR BLD AUTO: 4 % (ref 0.3–6.2)
ERYTHROCYTE [DISTWIDTH] IN BLOOD BY AUTOMATED COUNT: 15.4 % (ref 12.3–15.4)
FERRITIN SERPL-MCNC: 9.57 NG/ML (ref 13–150)
HCT VFR BLD AUTO: 34.6 % (ref 34–46.6)
HGB BLD-MCNC: 12.1 G/DL (ref 12–15.9)
IMM GRANULOCYTES # BLD AUTO: 0.01 10*3/MM3 (ref 0–0.05)
IMM GRANULOCYTES NFR BLD AUTO: 0.1 % (ref 0–0.5)
IRON 24H UR-MRATE: 62 MCG/DL (ref 37–145)
IRON SATN MFR SERPL: 14 % (ref 20–50)
LYMPHOCYTES # BLD AUTO: 2.62 10*3/MM3 (ref 0.7–3.1)
LYMPHOCYTES NFR BLD AUTO: 34.1 % (ref 19.6–45.3)
MCH RBC QN AUTO: 27.4 PG (ref 26.6–33)
MCHC RBC AUTO-ENTMCNC: 35 G/DL (ref 31.5–35.7)
MCV RBC AUTO: 78.3 FL (ref 79–97)
MONOCYTES # BLD AUTO: 0.66 10*3/MM3 (ref 0.1–0.9)
MONOCYTES NFR BLD AUTO: 8.6 % (ref 5–12)
NEUTROPHILS NFR BLD AUTO: 4.05 10*3/MM3 (ref 1.7–7)
NEUTROPHILS NFR BLD AUTO: 52.7 % (ref 42.7–76)
PLATELET # BLD AUTO: 373 10*3/MM3 (ref 140–450)
PMV BLD AUTO: 9.9 FL (ref 6–12)
RBC # BLD AUTO: 4.42 10*6/MM3 (ref 3.77–5.28)
TIBC SERPL-MCNC: 451 MCG/DL (ref 298–536)
TRANSFERRIN SERPL-MCNC: 303 MG/DL (ref 200–360)
WBC NRBC COR # BLD: 7.69 10*3/MM3 (ref 3.4–10.8)

## 2023-10-25 PROCEDURE — 82728 ASSAY OF FERRITIN: CPT

## 2023-10-25 PROCEDURE — 84466 ASSAY OF TRANSFERRIN: CPT

## 2023-10-25 PROCEDURE — 85025 COMPLETE CBC W/AUTO DIFF WBC: CPT

## 2023-10-25 PROCEDURE — 83540 ASSAY OF IRON: CPT

## 2023-10-25 PROCEDURE — 36415 COLL VENOUS BLD VENIPUNCTURE: CPT

## 2023-10-26 ENCOUNTER — OFFICE VISIT (OUTPATIENT)
Dept: ONCOLOGY | Facility: HOSPITAL | Age: 33
End: 2023-10-26
Payer: COMMERCIAL

## 2023-10-26 VITALS
RESPIRATION RATE: 18 BRPM | TEMPERATURE: 97.3 F | SYSTOLIC BLOOD PRESSURE: 155 MMHG | HEART RATE: 91 BPM | DIASTOLIC BLOOD PRESSURE: 89 MMHG | BODY MASS INDEX: 30.96 KG/M2 | WEIGHT: 158.51 LBS | OXYGEN SATURATION: 100 %

## 2023-10-26 DIAGNOSIS — D50.9 IRON DEFICIENCY ANEMIA, UNSPECIFIED IRON DEFICIENCY ANEMIA TYPE: Primary | ICD-10-CM

## 2023-10-26 DIAGNOSIS — K90.9 MALABSORPTION OF IRON: ICD-10-CM

## 2023-10-26 DIAGNOSIS — D25.9 UTERINE LEIOMYOMA, UNSPECIFIED LOCATION: ICD-10-CM

## 2023-10-26 RX ORDER — ACETAMINOPHEN 325 MG/1
650 TABLET ORAL ONCE
OUTPATIENT
Start: 2023-11-09

## 2023-10-26 RX ORDER — ACETAMINOPHEN 325 MG/1
650 TABLET ORAL ONCE
OUTPATIENT
Start: 2023-11-16

## 2023-10-26 RX ORDER — ERGOCALCIFEROL 1.25 MG/1
50000 CAPSULE ORAL WEEKLY
Qty: 13 CAPSULE | Refills: 1 | Status: SHIPPED | OUTPATIENT
Start: 2023-10-26

## 2023-10-26 RX ORDER — SODIUM CHLORIDE 9 MG/ML
20 INJECTION, SOLUTION INTRAVENOUS ONCE
OUTPATIENT
Start: 2023-11-09

## 2023-10-26 RX ORDER — ACETAMINOPHEN 325 MG/1
650 TABLET ORAL ONCE
OUTPATIENT
Start: 2023-10-26

## 2023-10-26 RX ORDER — ACETAMINOPHEN 325 MG/1
650 TABLET ORAL ONCE
OUTPATIENT
Start: 2023-11-02

## 2023-10-26 RX ORDER — SODIUM CHLORIDE 9 MG/ML
20 INJECTION, SOLUTION INTRAVENOUS ONCE
OUTPATIENT
Start: 2023-10-26

## 2023-10-26 RX ORDER — SODIUM CHLORIDE 9 MG/ML
20 INJECTION, SOLUTION INTRAVENOUS ONCE
OUTPATIENT
Start: 2023-11-16

## 2023-10-26 RX ORDER — SODIUM CHLORIDE 9 MG/ML
20 INJECTION, SOLUTION INTRAVENOUS ONCE
OUTPATIENT
Start: 2023-11-02

## 2023-10-26 NOTE — PROGRESS NOTES
Chief Complaint/Reason for Referral:  Iron deficiency anemia      Adriano Curiel, *  Adriano Curiel, APRN    Records Obtained:  Records of the patients history including those obtained from  [facility] and patient information were reviewed and summarized in detail.    Madeline Veliz presents to [facility] for iron deficinency anemia    Subjective  Ms. Madeline Veliz presents for follow up for iron deficiency anemia. She has heavy menses since she started her menstrual cycles which last from 5-7 days and reports all the days are heavy. Also has depression, anxiety, anemia, insomnia, vitamin D deficiency, history of uterine fibroids. Former smoker. She reports feeling well overall. No major fatigue. Menstrual cycles improved. Needed to get BP under better control prior to starting birth control. Plans to start birth control next month. No other bleeding noted. Will plan for repeat IV iron.       Hematology History    10/14/2022: Iron 20, ferritin 13, iron sat of 4%, TIBC 514, WBC 4.81, hemoglobin 9.4. MCV 72.0, platelet count 466.    12/8/22: Hgb electrophoresis: 62% HbA, 34% HbC, no sickle cell. Iron sat 6%, ferritin 5, iron 31. Hgb 9.7    2/9/23: Iron 36, iron sat 7%, ferritin 6.8, TIBC 536. Hgb 9.9    4/12/23- 5/18/23: Last of 4 IV Ferric gluconate treatments    6/16/23: Ferritin 82.7, iron 83, iron sat 23%, TIBC 356, Hgb 13, MCV 78.6    10/25/23: Hgb 12.1, MCV 78.3, Iron sat 14%, ferritin 9.57      Oncology/Hematology History    No history exists.       Review of Systems   Constitutional:  Negative for fatigue.   HENT: Negative.     Eyes: Negative.    Respiratory: Negative.     Cardiovascular: Negative.    Gastrointestinal: Negative.    Endocrine: Negative.    Genitourinary:  Negative for vaginal bleeding.   Musculoskeletal: Negative.    Allergic/Immunologic: Negative.    Neurological: Negative.    Hematological: Negative.    Psychiatric/Behavioral: Negative.      All other systems reviewed and are negative.      Current Outpatient Medications on File Prior to Visit   Medication Sig Dispense Refill    Diclofenac Sodium (VOLTAREN) 1 % gel gel Apply 4 g topically to the appropriate area as directed 4 (Four) Times a Day As Needed (knee pain). 100 g 2    hydroCHLOROthiazide (HYDRODIURIL) 12.5 MG tablet Take 1 tablet by mouth Daily. 90 tablet 1    magnesium oxide (MAG-OX) 400 MG tablet Take 1 tablet by mouth Daily. (Patient not taking: Reported on 10/23/2023) 90 tablet 1    Sodium Fluoride 5000 PPM 1.1 % paste        No current facility-administered medications on file prior to visit.       No Known Allergies  Past Medical History:   Diagnosis Date    Anxiety     Depression     Hypertension     during pregnancy     History reviewed. No pertinent surgical history.  Social History     Socioeconomic History    Marital status:    Tobacco Use    Smoking status: Former     Packs/day: 0.25     Years: 10.00     Additional pack years: 0.00     Total pack years: 2.50     Types: Cigarettes     Quit date: 2021     Years since quittin.4    Smokeless tobacco: Never   Vaping Use    Vaping Use: Every day    Substances: Nicotine, Flavoring    Devices: Disposable   Substance and Sexual Activity    Alcohol use: Never    Drug use: Never    Sexual activity: Defer     Family History   Problem Relation Age of Onset    French Parkinson White syndrome Mother     Hypertension Father     Diabetes Paternal Grandmother      Immunization History   Administered Date(s) Administered    COVID-19 (PFIZER) Purple Cap Monovalent 2021, 2021    Hepatitis B 2003, 10/17/2003    MMR 1999    Td (TDVAX) 2003    Tdap 2016       Tobacco Use: Medium Risk (10/26/2023)    Patient History     Smoking Tobacco Use: Former     Smokeless Tobacco Use: Never     Passive Exposure: Not on file       Objective     Physical Exam  Well appearing patient in no acute distress on  RA  Anicteric sclerae, no rash on exposed skin  Respirations non-labored  Awake, alert, and oriented x 4. Speech intact. No gross neurologic deficit  Appropriate mood and affect    Vitals:    10/26/23 1259   BP: 155/89   Pulse: 91   Resp: 18   Temp: 97.3 °F (36.3 °C)   TempSrc: Temporal   SpO2: 100%   Weight: 71.9 kg (158 lb 8.2 oz)   PainSc: 0-No pain         Wt Readings from Last 3 Encounters:   10/23/23 72.8 kg (160 lb 9.6 oz)   06/22/23 71.4 kg (157 lb 6.5 oz)   04/21/23 70.3 kg (155 lb)                  ECOG: (0) Fully Active - Able to Carry On All Pre-disease Performance Without Restriction  Fall Risk Assessment was completed, and patient is at low risk for falls.  PHQ-9 Total Score:         The patient is  experiencing fatigue. Fatigue score: 0    PT/OT Functional Screening: PT fx screen: No needs identified  Speech Functional Screening: Speech fx screen: No needs identified  Rehab to be ordered: Rehab to be ordered: No needs identified        Result Review :  The following data was reviewed by: Adam Renae MD on 10/26/23:  Lab Results   Component Value Date    HGB 12.1 10/25/2023    HCT 34.6 10/25/2023    MCV 78.3 (L) 10/25/2023     10/25/2023    WBC 7.69 10/25/2023    NEUTROABS 4.05 10/25/2023    LYMPHSABS 2.62 10/25/2023    MONOSABS 0.66 10/25/2023    EOSABS 0.31 10/25/2023    BASOSABS 0.04 10/25/2023     Lab Results   Component Value Date    GLUCOSE 96 10/23/2023    BUN 8 10/23/2023    CREATININE 0.86 10/23/2023     10/23/2023    K 4.5 10/23/2023     10/23/2023    CO2 24.5 10/23/2023    CALCIUM 9.1 10/23/2023    PROTEINTOT 6.6 10/23/2023    ALBUMIN 4.4 10/23/2023    BILITOT <0.2 10/23/2023    ALKPHOS 71 10/23/2023    AST 19 10/23/2023    ALT 12 10/23/2023       Labs reviewed. No anemia but iron levels dropped         Assessment and Plan:  Diagnoses and all orders for this visit:    1. Iron deficiency anemia, unspecified iron deficiency anemia type (Primary)  -     CBC &  Differential; Future  -     Iron Profile; Future  -     Ferritin; Future  -     ONCBCN INFUSION APPOINTMENT REQUEST 01; Future  -     ONCBCN INFUSION APPOINTMENT REQUEST 01; Future  -     ONCBCN INFUSION APPOINTMENT REQUEST 01; Future  -     ONCBCN INFUSION APPOINTMENT REQUEST 01; Future    2. Malabsorption of iron  -     ONCBCN INFUSION APPOINTMENT REQUEST 01; Future  -     ONCBCN INFUSION APPOINTMENT REQUEST 01; Future  -     ONCBCN INFUSION APPOINTMENT REQUEST 01; Future  -     ONCBCN INFUSION APPOINTMENT REQUEST 01; Future    3. Uterine leiomyoma, unspecified location    Other orders  -     sodium chloride 0.9 % infusion  -     acetaminophen (TYLENOL) tablet 650 mg  -     diphenhydrAMINE (BENADRYL) IVPB 25 mg  -     ferric gluconate (FERRLECIT) 250 MG in sodium chloride 0.9% 250 mL IVPB  -     sodium chloride 0.9 % infusion  -     acetaminophen (TYLENOL) tablet 650 mg  -     diphenhydrAMINE (BENADRYL) IVPB 25 mg  -     ferric gluconate (FERRLECIT) 250 MG in sodium chloride 0.9% 250 mL IVPB  -     sodium chloride 0.9 % infusion  -     acetaminophen (TYLENOL) tablet 650 mg  -     diphenhydrAMINE (BENADRYL) IVPB 25 mg  -     ferric gluconate (FERRLECIT) 250 MG in sodium chloride 0.9% 250 mL IVPB  -     sodium chloride 0.9 % infusion  -     acetaminophen (TYLENOL) tablet 650 mg  -     diphenhydrAMINE (BENADRYL) IVPB 25 mg  -     ferric gluconate (FERRLECIT) 250 MG in sodium chloride 0.9% 250 mL IVPB        Microcytic anemia with iron deficiency anemia noted as well.  History of heavy menses as likely source of iron loss. She also has history of uterine fibroid on CT scan from 6/21/2021. Planning to follow up with GYN to start on oral contraception next month to lessen the effects of the heavy menses which would help the MANEULA. She has seen them and has plans to start birth control soon.  She is not quite sure about an ablation as she may be anticipating future pregnancies. She has a 16 year old child.     Iron  improved after ferrlecit but has dropped again as of this month. Will plan for repeat IV Ferrlecit and repeat labs in 4 months.     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 Likely has no clinical significance as she does not have any thalassemia or sickle cell disease concurrent with it.       I spent 15 minutes caring for Madeline on this date of service. This time includes time spent by me in the following activities:preparing for the visit, reviewing tests, obtaining and/or reviewing a separately obtained history, performing a medically appropriate examination and/or evaluation , counseling and educating the patient/family/caregiver, ordering medications, tests, or procedures, referring and communicating with other health care professionals , documenting information in the medical record and independently interpreting results and communicating that information with the patient/family/caregiver    Patient Follow Up: 4 months    Patient was given instructions and counseling regarding her condition or for health maintenance advice. Please see specific information pulled into the AVS if appropriate.

## 2023-11-02 DIAGNOSIS — D50.9 IRON DEFICIENCY ANEMIA, UNSPECIFIED IRON DEFICIENCY ANEMIA TYPE: Primary | ICD-10-CM

## 2023-11-30 ENCOUNTER — HOSPITAL ENCOUNTER (OUTPATIENT)
Dept: INFUSION THERAPY | Facility: HOSPITAL | Age: 33
Discharge: HOME | End: 2023-11-30
Payer: COMMERCIAL

## 2023-11-30 VITALS
RESPIRATION RATE: 18 BRPM | OXYGEN SATURATION: 98 % | HEART RATE: 81 BPM | TEMPERATURE: 98.8 F | HEIGHT: 61 IN | WEIGHT: 161.82 LBS | DIASTOLIC BLOOD PRESSURE: 85 MMHG | SYSTOLIC BLOOD PRESSURE: 149 MMHG | BODY MASS INDEX: 30.55 KG/M2

## 2023-11-30 DIAGNOSIS — D50.9 IRON DEFICIENCY ANEMIA, UNSPECIFIED IRON DEFICIENCY ANEMIA TYPE: Primary | ICD-10-CM

## 2023-11-30 DIAGNOSIS — K90.9 MALABSORPTION OF IRON: ICD-10-CM

## 2023-11-30 PROCEDURE — 96365 THER/PROPH/DIAG IV INF INIT: CPT

## 2023-11-30 PROCEDURE — 96366 THER/PROPH/DIAG IV INF ADDON: CPT

## 2023-11-30 PROCEDURE — 25010000002 NA FERRIC GLUC CPLX PER 12.5 MG: Performed by: INTERNAL MEDICINE

## 2023-11-30 PROCEDURE — 25810000003 SODIUM CHLORIDE 0.9 % SOLUTION: Performed by: INTERNAL MEDICINE

## 2023-11-30 RX ORDER — ACETAMINOPHEN 325 MG/1
650 TABLET ORAL ONCE
Status: DISCONTINUED | OUTPATIENT
Start: 2023-11-30 | End: 2023-12-02 | Stop reason: HOSPADM

## 2023-11-30 RX ADMIN — SODIUM CHLORIDE 250 MG: 9 INJECTION, SOLUTION INTRAVENOUS at 13:58

## 2024-04-07 ENCOUNTER — HOSPITAL ENCOUNTER (EMERGENCY)
Facility: HOSPITAL | Age: 34
Discharge: HOME | End: 2024-04-07
Attending: EMERGENCY MEDICINE | Admitting: EMERGENCY MEDICINE
Payer: COMMERCIAL

## 2024-04-07 ENCOUNTER — APPOINTMENT (OUTPATIENT)
Dept: ULTRASOUND IMAGING | Facility: HOSPITAL | Age: 34
End: 2024-04-07
Payer: COMMERCIAL

## 2024-04-07 VITALS
BODY MASS INDEX: 32.16 KG/M2 | TEMPERATURE: 98 F | WEIGHT: 163.8 LBS | HEIGHT: 60 IN | SYSTOLIC BLOOD PRESSURE: 130 MMHG | HEART RATE: 88 BPM | RESPIRATION RATE: 16 BRPM | OXYGEN SATURATION: 99 % | DIASTOLIC BLOOD PRESSURE: 64 MMHG

## 2024-04-07 DIAGNOSIS — O46.90 VAGINAL BLEEDING IN PREGNANCY: Primary | ICD-10-CM

## 2024-04-07 LAB
ABO GROUP BLD: NORMAL
ALBUMIN SERPL-MCNC: 4.1 G/DL (ref 3.5–5.2)
ALBUMIN/GLOB SERPL: 1.5 G/DL
ALP SERPL-CCNC: 68 U/L (ref 39–117)
ALT SERPL W P-5'-P-CCNC: 11 U/L (ref 1–33)
ANION GAP SERPL CALCULATED.3IONS-SCNC: 14.4 MMOL/L (ref 5–15)
AST SERPL-CCNC: 17 U/L (ref 1–32)
BACTERIA UR QL AUTO: ABNORMAL /HPF
BASOPHILS # BLD AUTO: 0.04 10*3/MM3 (ref 0–0.2)
BASOPHILS NFR BLD AUTO: 0.3 % (ref 0–1.5)
BILIRUB SERPL-MCNC: 0.2 MG/DL (ref 0–1.2)
BILIRUB UR QL STRIP: NEGATIVE
BLD GP AB SCN SERPL QL: NEGATIVE
BUN SERPL-MCNC: 7 MG/DL (ref 6–20)
BUN/CREAT SERPL: 8.8 (ref 7–25)
CALCIUM SPEC-SCNC: 9.1 MG/DL (ref 8.6–10.5)
CHLORIDE SERPL-SCNC: 101 MMOL/L (ref 98–107)
CLARITY UR: CLEAR
CO2 SERPL-SCNC: 20.6 MMOL/L (ref 22–29)
COLOR UR: YELLOW
CREAT SERPL-MCNC: 0.8 MG/DL (ref 0.57–1)
DEPRECATED RDW RBC AUTO: 47.2 FL (ref 37–54)
EGFRCR SERPLBLD CKD-EPI 2021: 98.7 ML/MIN/1.73
EOSINOPHIL # BLD AUTO: 0.23 10*3/MM3 (ref 0–0.4)
EOSINOPHIL NFR BLD AUTO: 2 % (ref 0.3–6.2)
ERYTHROCYTE [DISTWIDTH] IN BLOOD BY AUTOMATED COUNT: 18.1 % (ref 12.3–15.4)
GLOBULIN UR ELPH-MCNC: 2.8 GM/DL
GLUCOSE SERPL-MCNC: 113 MG/DL (ref 65–99)
GLUCOSE UR STRIP-MCNC: NEGATIVE MG/DL
HCG INTACT+B SERPL-ACNC: NORMAL MIU/ML
HCT VFR BLD AUTO: 30.7 % (ref 34–46.6)
HGB BLD-MCNC: 10.7 G/DL (ref 12–15.9)
HGB UR QL STRIP.AUTO: ABNORMAL
HOLD SPECIMEN: NORMAL
HOLD SPECIMEN: NORMAL
HYALINE CASTS UR QL AUTO: ABNORMAL /LPF
IMM GRANULOCYTES # BLD AUTO: 0.04 10*3/MM3 (ref 0–0.05)
IMM GRANULOCYTES NFR BLD AUTO: 0.3 % (ref 0–0.5)
KETONES UR QL STRIP: NEGATIVE
LEUKOCYTE ESTERASE UR QL STRIP.AUTO: ABNORMAL
LYMPHOCYTES # BLD AUTO: 3.68 10*3/MM3 (ref 0.7–3.1)
LYMPHOCYTES NFR BLD AUTO: 31.9 % (ref 19.6–45.3)
MCH RBC QN AUTO: 25.7 PG (ref 26.6–33)
MCHC RBC AUTO-ENTMCNC: 34.9 G/DL (ref 31.5–35.7)
MCV RBC AUTO: 73.6 FL (ref 79–97)
MONOCYTES # BLD AUTO: 1.02 10*3/MM3 (ref 0.1–0.9)
MONOCYTES NFR BLD AUTO: 8.8 % (ref 5–12)
NEUTROPHILS NFR BLD AUTO: 56.7 % (ref 42.7–76)
NEUTROPHILS NFR BLD AUTO: 6.54 10*3/MM3 (ref 1.7–7)
NITRITE UR QL STRIP: NEGATIVE
NRBC BLD AUTO-RTO: 0 /100 WBC (ref 0–0.2)
NUMBER OF DOSES: NORMAL
PH UR STRIP.AUTO: 6 [PH] (ref 5–8)
PLATELET # BLD AUTO: 465 10*3/MM3 (ref 140–450)
PMV BLD AUTO: 9.5 FL (ref 6–12)
POTASSIUM SERPL-SCNC: 3.6 MMOL/L (ref 3.5–5.2)
PROT SERPL-MCNC: 6.9 G/DL (ref 6–8.5)
PROT UR QL STRIP: NEGATIVE
RBC # BLD AUTO: 4.17 10*6/MM3 (ref 3.77–5.28)
RBC # UR STRIP: ABNORMAL /HPF
REF LAB TEST METHOD: ABNORMAL
RH BLD: POSITIVE
SODIUM SERPL-SCNC: 136 MMOL/L (ref 136–145)
SP GR UR STRIP: 1.01 (ref 1–1.03)
SQUAMOUS #/AREA URNS HPF: ABNORMAL /HPF
UROBILINOGEN UR QL STRIP: ABNORMAL
WBC # UR STRIP: ABNORMAL /HPF
WBC NRBC COR # BLD AUTO: 11.55 10*3/MM3 (ref 3.4–10.8)
WHOLE BLOOD HOLD COAG: NORMAL
WHOLE BLOOD HOLD SPECIMEN: NORMAL

## 2024-04-07 PROCEDURE — 86901 BLOOD TYPING SEROLOGIC RH(D): CPT | Performed by: EMERGENCY MEDICINE

## 2024-04-07 PROCEDURE — 87086 URINE CULTURE/COLONY COUNT: CPT

## 2024-04-07 PROCEDURE — 96365 THER/PROPH/DIAG IV INF INIT: CPT

## 2024-04-07 PROCEDURE — 76817 TRANSVAGINAL US OBSTETRIC: CPT

## 2024-04-07 PROCEDURE — 36415 COLL VENOUS BLD VENIPUNCTURE: CPT

## 2024-04-07 PROCEDURE — 84702 CHORIONIC GONADOTROPIN TEST: CPT | Performed by: EMERGENCY MEDICINE

## 2024-04-07 PROCEDURE — 86850 RBC ANTIBODY SCREEN: CPT | Performed by: EMERGENCY MEDICINE

## 2024-04-07 PROCEDURE — 80053 COMPREHEN METABOLIC PANEL: CPT | Performed by: EMERGENCY MEDICINE

## 2024-04-07 PROCEDURE — 25010000002 CEFTRIAXONE PER 250 MG

## 2024-04-07 PROCEDURE — 81001 URINALYSIS AUTO W/SCOPE: CPT | Performed by: EMERGENCY MEDICINE

## 2024-04-07 PROCEDURE — 85025 COMPLETE CBC W/AUTO DIFF WBC: CPT | Performed by: EMERGENCY MEDICINE

## 2024-04-07 PROCEDURE — 86900 BLOOD TYPING SEROLOGIC ABO: CPT | Performed by: EMERGENCY MEDICINE

## 2024-04-07 PROCEDURE — 99284 EMERGENCY DEPT VISIT MOD MDM: CPT

## 2024-04-07 RX ORDER — SODIUM CHLORIDE 0.9 % (FLUSH) 0.9 %
10 SYRINGE (ML) INJECTION AS NEEDED
Status: DISCONTINUED | OUTPATIENT
Start: 2024-04-07 | End: 2024-04-07 | Stop reason: HOSPADM

## 2024-04-07 RX ADMIN — CEFTRIAXONE SODIUM 2000 MG: 2 INJECTION, POWDER, FOR SOLUTION INTRAMUSCULAR; INTRAVENOUS at 16:57

## 2024-04-07 NOTE — ED PROVIDER NOTES
Time: 6:27 PM EDT  Date of encounter:  4/7/2024  Independent Historian/Clinical History and Information was obtained by:   Patient    History is limited by: N/A    Chief Complaint: Vaginal bleeding      History of Present Illness:  Patient is a 33 y.o. year old female who presents to the emergency department for evaluation of vaginal bleeding and spotting that began today.  Patient states she recently found out she was pregnant.  Patient is G3, P1.  Patient denies abdominal pain.  Patient denies fever.     HPI    Patient Care Team  Primary Care Provider: Adriano Curiel APRN    Past Medical History:     No Known Allergies  Past Medical History:   Diagnosis Date    Anxiety     Depression     Hypertension     during pregnancy     History reviewed. No pertinent surgical history.  Family History   Problem Relation Age of Onset    French Parkinson White syndrome Mother     Hypertension Father     Diabetes Paternal Grandmother        Home Medications:  Prior to Admission medications    Medication Sig Start Date End Date Taking? Authorizing Provider   Diclofenac Sodium (VOLTAREN) 1 % gel gel Apply 4 g topically to the appropriate area as directed 4 (Four) Times a Day As Needed (knee pain). 1/24/22   Adriano Curiel APRN   hydroCHLOROthiazide (HYDRODIURIL) 12.5 MG tablet Take 1 tablet by mouth Daily. 10/23/23   Adriano Curiel APRN   magnesium oxide (MAG-OX) 400 MG tablet Take 1 tablet by mouth Daily.  Patient not taking: Reported on 10/23/2023 4/21/23   Adriano Curiel APRN   Sodium Fluoride 5000 PPM 1.1 % paste  9/9/22   Provider, MD Shane   vitamin D (ERGOCALCIFEROL) 1.25 MG (45723 UT) capsule capsule Take 1 capsule by mouth 1 (One) Time Per Week. 10/26/23   Adriano Curiel APRN        Social History:   Social History     Tobacco Use    Smoking status: Former     Current packs/day: 0.00     Average packs/day: 0.3 packs/day for 10.0 years (2.5 ttl pk-yrs)     Types: Cigarettes      "Start date: 2011     Quit date: 2021     Years since quittin.9    Smokeless tobacco: Never   Vaping Use    Vaping status: Every Day    Substances: Nicotine, Flavoring    Devices: Disposable   Substance Use Topics    Alcohol use: Never    Drug use: Never         Review of Systems:  Review of Systems   Constitutional:  Negative for chills and fever.   HENT:  Negative for congestion, rhinorrhea and sore throat.    Eyes:  Negative for pain and visual disturbance.   Respiratory:  Negative for apnea, cough, chest tightness and shortness of breath.    Cardiovascular:  Negative for chest pain and palpitations.   Gastrointestinal:  Negative for abdominal pain, diarrhea, nausea and vomiting.   Genitourinary:  Positive for vaginal bleeding. Negative for difficulty urinating and dysuria.   Musculoskeletal:  Negative for joint swelling and myalgias.   Skin:  Negative for color change.   Neurological:  Negative for seizures and headaches.   Psychiatric/Behavioral: Negative.     All other systems reviewed and are negative.       Physical Exam:  /64   Pulse 88   Temp 98 °F (36.7 °C) (Oral)   Resp 16   Ht 152.4 cm (60\")   Wt 74.3 kg (163 lb 12.8 oz)   LMP 2024   SpO2 99%   BMI 31.99 kg/m²     Physical Exam  Vitals and nursing note reviewed.   Constitutional:       General: She is not in acute distress.     Appearance: Normal appearance. She is not toxic-appearing.   HENT:      Head: Normocephalic and atraumatic.      Jaw: There is normal jaw occlusion.   Eyes:      General: Lids are normal.      Extraocular Movements: Extraocular movements intact.      Conjunctiva/sclera: Conjunctivae normal.      Pupils: Pupils are equal, round, and reactive to light.   Cardiovascular:      Rate and Rhythm: Normal rate and regular rhythm.      Pulses: Normal pulses.      Heart sounds: Normal heart sounds.   Pulmonary:      Effort: Pulmonary effort is normal. No respiratory distress.      Breath sounds: Normal breath " sounds. No wheezing or rhonchi.   Abdominal:      General: Abdomen is flat.      Palpations: Abdomen is soft.      Tenderness: There is no abdominal tenderness. There is no guarding or rebound.   Musculoskeletal:         General: Normal range of motion.      Cervical back: Normal range of motion and neck supple.      Right lower leg: No edema.      Left lower leg: No edema.   Skin:     General: Skin is warm and dry.   Neurological:      Mental Status: She is alert and oriented to person, place, and time. Mental status is at baseline.   Psychiatric:         Mood and Affect: Mood normal.                  Procedures:  Procedures      Medical Decision Making:      Comorbidities that affect care:    Hypertension, pregnancy    External Notes reviewed:          The following orders were placed and all results were independently analyzed by me:  Orders Placed This Encounter   Procedures    Urine Culture - Urine,    US Ob Transvaginal    Galt Draw    hCG, Quantitative, Pregnancy    CBC Auto Differential    Comprehensive Metabolic Panel    Urinalysis With Microscopic If Indicated (No Culture) - Urine, Clean Catch    Urinalysis, Microscopic Only - Urine, Clean Catch    Ambulatory Referral to Obstetrics / Gynecology    NPO Diet NPO Type: Strict NPO    Undress & Gown    Vital Signs    Pulse Oximetry    Oxygen Therapy- Nasal Cannula; Titrate 1-6 LPM Per SpO2; 90 - 95%     RhIg Evaluation    Doses of Rh Immune Globulin    Insert Peripheral IV    CBC & Differential    Green Top (Gel)    Lavender Top    Gold Top - SST    Light Blue Top       Medications Given in the Emergency Department:  Medications   sodium chloride 0.9 % flush 10 mL (has no administration in time range)   cefTRIAXone (ROCEPHIN) 2000 mg/100 mL 0.9% NS IVPB (MBP) (0 mg Intravenous Stopped 24 180)        ED Course:         Labs:    Lab Results (last 24 hours)       Procedure Component Value Units Date/Time    CBC & Differential [[id number]]   (Abnormal) Collected: 04/07/24 1618    Specimen: Blood Updated: 04/07/24 1645    Narrative:      The following orders were created for panel order CBC & Differential.  Procedure                               Abnormality         Status                     ---------                               -----------         ------                     CBC Auto Differential[[id number]]        Abnormal            Final result                 Please view results for these tests on the individual orders.    hCG, Quantitative, Pregnancy [[id number]] Collected: 04/07/24 1618    Specimen: Blood Updated: 04/07/24 1709     HCG Quantitative 15,934.00 mIU/mL     Narrative:      HCG Ranges by Gestational Age    Females - non-pregnant premenopausal   </= 1mIU/mL HCG  Females - postmenopausal               </= 7mIU/mL HCG    3 Weeks       5.4   -      72 mIU/mL  4 Weeks      10.2   -     708 mIU/mL  5 Weeks       217   -   8,245 mIU/mL  6 Weeks       152   -  32,177 mIU/mL  7 Weeks     4,059   - 153,767 mIU/mL  8 Weeks    31,366   - 149,094 mIU/mL  9 Weeks    59,109   - 135,901 mIU/mL  10 Weeks   44,186   - 170,409 mIU/mL  12 Weeks   27,107   - 201,615 mIU/mL  14 Weeks   24,302   -  93,646 mIU/mL  15 Weeks   12,540   -  69,747 mIU/mL  16 Weeks    8,904   -  55,332 mIU/mL  17 Weeks    8,240   -  51,793 mIU/mL  18 Weeks    9,649   -  55,271 mIU/mL      CBC Auto Differential [[id number]]  (Abnormal) Collected: 04/07/24 1618    Specimen: Blood Updated: 04/07/24 1645     WBC 11.55 10*3/mm3      RBC 4.17 10*6/mm3      Hemoglobin 10.7 g/dL      Hematocrit 30.7 %      MCV 73.6 fL      MCH 25.7 pg      MCHC 34.9 g/dL      RDW 18.1 %      RDW-SD 47.2 fl      MPV 9.5 fL      Platelets 465 10*3/mm3      Neutrophil % 56.7 %      Lymphocyte % 31.9 %      Monocyte % 8.8 %      Eosinophil % 2.0 %      Basophil % 0.3 %      Immature Grans % 0.3 %      Neutrophils, Absolute 6.54 10*3/mm3      Lymphocytes, Absolute 3.68 10*3/mm3      Monocytes, Absolute 1.02  10*3/mm3      Eosinophils, Absolute 0.23 10*3/mm3      Basophils, Absolute 0.04 10*3/mm3      Immature Grans, Absolute 0.04 10*3/mm3      nRBC 0.0 /100 WBC     Comprehensive Metabolic Panel [[id number]]  (Abnormal) Collected: 04/07/24 1618    Specimen: Blood Updated: 04/07/24 1658     Glucose 113 mg/dL      BUN 7 mg/dL      Creatinine 0.80 mg/dL      Sodium 136 mmol/L      Potassium 3.6 mmol/L      Chloride 101 mmol/L      CO2 20.6 mmol/L      Calcium 9.1 mg/dL      Total Protein 6.9 g/dL      Albumin 4.1 g/dL      ALT (SGPT) 11 U/L      AST (SGOT) 17 U/L      Alkaline Phosphatase 68 U/L      Total Bilirubin 0.2 mg/dL      Globulin 2.8 gm/dL      A/G Ratio 1.5 g/dL      BUN/Creatinine Ratio 8.8     Anion Gap 14.4 mmol/L      eGFR 98.7 mL/min/1.73     Narrative:      GFR Normal >60  Chronic Kidney Disease <60  Kidney Failure <15      Urinalysis With Microscopic If Indicated (No Culture) - Urine, Clean Catch [[id number]]  (Abnormal) Collected: 04/07/24 1619    Specimen: Urine, Clean Catch Updated: 04/07/24 1636     Color, UA Yellow     Appearance, UA Clear     pH, UA 6.0     Specific Gravity, UA 1.006     Glucose, UA Negative     Ketones, UA Negative     Bilirubin, UA Negative     Blood, UA Trace     Protein, UA Negative     Leuk Esterase, UA Small (1+)     Nitrite, UA Negative     Urobilinogen, UA 0.2 E.U./dL    Urinalysis, Microscopic Only - Urine, Clean Catch [[id number]]  (Abnormal) Collected: 04/07/24 1619    Specimen: Urine, Clean Catch Updated: 04/07/24 1636     RBC, UA 0-2 /HPF      WBC, UA 21-50 /HPF      Bacteria, UA None Seen /HPF      Squamous Epithelial Cells, UA 0-2 /HPF      Hyaline Casts, UA 0-2 /LPF      Methodology Automated Microscopy    Urine Culture - Urine, Urine, Clean Catch [[id number]] Collected: 04/07/24 1619    Specimen: Urine, Clean Catch Updated: 04/07/24 1646             Imaging:    US Ob Transvaginal    Result Date: 4/7/2024  US OB TRANSVAGINAL-  Date of Exam: 4/7/2024 6:25 PM  Indication:  bleeding, pregnant.  Comparison: No comparisons available.  Technique: Transvaginal ultrasound was performed to evaluate pregnancy. Real time scanning was performed with multiple image documentation per protocol.    Findings: A single intrauterine gestation is seen. Fetal cardiac activity is seen at a rate of 142 bpm.  The crown-rump length measures 3 mm, which corresponds to a gestational age of 6 weeks 0 days. The NAHEED is 12/1/2024.  A 3 mm yolk sac is evident. No unusual fluid collection is evident.  Multiple uterine masses are consistent with leiomyomas, the largest measuring almost 4 cm.  The right ovary measures 3.8 cm in greatest dimension, with a volume of 9.2 cc.  The left ovary measures 4.1 cm in greatest dimension, with a volume of 13.8 cc.      Impression: Transvaginal pelvic ultrasound for early pregnancy demonstrating single intrauterine gestation at 6 weeks 0 days ultrasound age. The NAHEED is 12/1/2024.  Multiple uterine masses, as above.    Electronically Signed By-SAFIA HORTON MD On:4/7/2024 8:06 PM         Differential Diagnosis and Discussion:    Vaginal Bleeding: Differential diagnosis includes but is not limited to foreign body, tumor, vaginitis, dysfunctional uterine bleeding, endocrine abnormalities, coagulation disorder, systemic illness, polyps, complications of pregnancy (possible ectopic pregnancy).    All labs were reviewed and interpreted by me.  Ultrasound impression was interpreted by me.     MDM     Patient's urinalysis shows evidence of UTI.  Give the patient a gram Rocephin in the ED, sent of urine culture, and will send the patient home on Keflex.  Patient's hemoglobin is 10.7 at this time not requiring transfusion.  Ultrasound shows  early pregnancy demonstrating single  intrauterine gestation at 6 weeks 0 days ultrasound age. The NAHEED is 12/1/2024.  I will provide an ambulatory referral to OB/GYN.  And emphasized the importance of strict pelvic rest.  Instructed patient to return  to ED if she develops any new or worsening symptoms.  Patient states she understands and agrees with plan of care.          Patient Care Considerations:          Consultants/Shared Management Plan:    None    Social Determinants of Health:    Patient is independent, reliable, and has access to care.       Disposition and Care Coordination:    Discharged: The patient is suitable and stable for discharge with no need for consideration of admission.    I have explained the patient´s condition, diagnoses and treatment plan based on the information available to me at this time. I have answered questions and addressed any concerns. The patient has a good  understanding of the patient´s diagnosis, condition, and treatment plan as can be expected at this point. The vital signs have been stable. The patient´s condition is stable and appropriate for discharge from the emergency department.      The patient will pursue further outpatient evaluation with the primary care physician or other designated or consulting physician as outlined in the discharge instructions. They are agreeable to this plan of care and follow-up instructions have been explained in detail. The patient has received these instructions in written format and has expressed an understanding of the discharge instructions. The patient is aware that any significant change in condition or worsening of symptoms should prompt an immediate return to this or the closest emergency department or call to 911.  I have explained discharge medications and the need for follow up with the patient/caretakers. This was also printed in the discharge instructions. Patient was discharged with the following medications and follow up:      Medication List      No changes were made to your prescriptions during this visit.      Kiet Jones MD  [address] [place] [address]  [phone]    Schedule an appointment as soon as possible for a visit in 3  days  Follow-up/further evaluation       Final diagnoses:   Vaginal bleeding in pregnancy        ED Disposition       ED Disposition   Discharge    Condition   Stable    Comment   --               This medical record created using voice recognition software.             Miller Will PA-C  04/07/24 2047

## 2024-04-09 LAB — BACTERIA SPEC AEROBE CULT: NO GROWTH

## 2024-04-11 ENCOUNTER — HOSPITAL ENCOUNTER (EMERGENCY)
Facility: HOSPITAL | Age: 34
Discharge: HOME | End: 2024-04-11
Attending: EMERGENCY MEDICINE
Payer: COMMERCIAL

## 2024-04-11 ENCOUNTER — APPOINTMENT (OUTPATIENT)
Dept: ULTRASOUND IMAGING | Facility: HOSPITAL | Age: 34
End: 2024-04-11
Payer: COMMERCIAL

## 2024-04-11 VITALS
TEMPERATURE: 98.4 F | BODY MASS INDEX: 31.68 KG/M2 | RESPIRATION RATE: 18 BRPM | OXYGEN SATURATION: 99 % | WEIGHT: 161.38 LBS | SYSTOLIC BLOOD PRESSURE: 141 MMHG | HEIGHT: 60 IN | DIASTOLIC BLOOD PRESSURE: 89 MMHG | HEART RATE: 76 BPM

## 2024-04-11 DIAGNOSIS — O20.8 SUBCHORIONIC HEMORRHAGE OF PLACENTA IN FIRST TRIMESTER: Primary | ICD-10-CM

## 2024-04-11 DIAGNOSIS — O20.9 VAGINAL BLEEDING IN PREGNANCY, FIRST TRIMESTER: ICD-10-CM

## 2024-04-11 LAB
BASOPHILS # BLD AUTO: 0.08 10*3/MM3 (ref 0–0.2)
BASOPHILS NFR BLD AUTO: 0.6 % (ref 0–1.5)
DEPRECATED RDW RBC AUTO: 49.6 FL (ref 37–54)
EOSINOPHIL # BLD AUTO: 0.24 10*3/MM3 (ref 0–0.4)
EOSINOPHIL NFR BLD AUTO: 1.9 % (ref 0.3–6.2)
ERYTHROCYTE [DISTWIDTH] IN BLOOD BY AUTOMATED COUNT: 19.4 % (ref 12.3–15.4)
HCG INTACT+B SERPL-ACNC: NORMAL MIU/ML
HCT VFR BLD AUTO: 32.3 % (ref 34–46.6)
HGB BLD-MCNC: 11.3 G/DL (ref 12–15.9)
HOLD SPECIMEN: NORMAL
HOLD SPECIMEN: NORMAL
IMM GRANULOCYTES # BLD AUTO: 0.06 10*3/MM3 (ref 0–0.05)
IMM GRANULOCYTES NFR BLD AUTO: 0.5 % (ref 0–0.5)
LYMPHOCYTES # BLD AUTO: 3.17 10*3/MM3 (ref 0.7–3.1)
LYMPHOCYTES NFR BLD AUTO: 25.6 % (ref 19.6–45.3)
MCH RBC QN AUTO: 25.9 PG (ref 26.6–33)
MCHC RBC AUTO-ENTMCNC: 35 G/DL (ref 31.5–35.7)
MCV RBC AUTO: 74.1 FL (ref 79–97)
MONOCYTES # BLD AUTO: 0.91 10*3/MM3 (ref 0.1–0.9)
MONOCYTES NFR BLD AUTO: 7.4 % (ref 5–12)
NEUTROPHILS NFR BLD AUTO: 64 % (ref 42.7–76)
NEUTROPHILS NFR BLD AUTO: 7.91 10*3/MM3 (ref 1.7–7)
NRBC BLD AUTO-RTO: 0 /100 WBC (ref 0–0.2)
PLATELET # BLD AUTO: 459 10*3/MM3 (ref 140–450)
PMV BLD AUTO: 9.6 FL (ref 6–12)
RBC # BLD AUTO: 4.36 10*6/MM3 (ref 3.77–5.28)
WBC NRBC COR # BLD AUTO: 12.37 10*3/MM3 (ref 3.4–10.8)
WHOLE BLOOD HOLD COAG: NORMAL
WHOLE BLOOD HOLD SPECIMEN: NORMAL

## 2024-04-11 PROCEDURE — 36415 COLL VENOUS BLD VENIPUNCTURE: CPT

## 2024-04-11 PROCEDURE — 76817 TRANSVAGINAL US OBSTETRIC: CPT

## 2024-04-11 PROCEDURE — 85025 COMPLETE CBC W/AUTO DIFF WBC: CPT

## 2024-04-11 PROCEDURE — 84702 CHORIONIC GONADOTROPIN TEST: CPT | Performed by: EMERGENCY MEDICINE

## 2024-04-11 PROCEDURE — 99284 EMERGENCY DEPT VISIT MOD MDM: CPT

## 2024-04-11 RX ORDER — SODIUM CHLORIDE 0.9 % (FLUSH) 0.9 %
10 SYRINGE (ML) INJECTION AS NEEDED
Status: DISCONTINUED | OUTPATIENT
Start: 2024-04-11 | End: 2024-04-11 | Stop reason: HOSPADM

## 2024-04-11 NOTE — DISCHARGE INSTRUCTIONS
Ultrasound today showed pregnancy at 6 weeks, 4 days with a heart rate of 124.  There is also a subchorionic hemorrhage.  Be sure to follow-up with OB as discussed.  Have your hCG redrawn on Saturday.    Bleeding precautions: If you are having heavy vaginal bleeding where you are saturating through an overnight pad in less than 30 minutes, or passing large clots the size of golf balls or racquet balls. This would be abnormal bleeding and you should seek immediate medical treatment. If you are having symptoms with the bleeding like lightheaded/dizziness, racing heart, shortness of breath, or feeling as if you are going to pass out seek immediate medical treatment.

## 2024-04-11 NOTE — ED PROVIDER NOTES
Time: 4:06 PM EDT  Date of encounter:  4/11/2024  Independent Historian/Clinical History and Information was obtained by:   Patient    History is limited by: N/A    Chief Complaint: Pregnant, vaginal bleeding      History of Present Illness:  Patient is a 33 y.o. year old female who presents to the emergency department for evaluation of bright red vaginal bleeding with wiping that started today.  Patient denies abdominal pain, pelvic cramping, clots, urinary frequency or dysuria.  Patient reports that she is 6 weeks pregnant.  Was here 4 days ago for brownish discharge.  States that this stopped on the next day.  Reports that she had miscarriage 2 years ago and every time there is some bleeding she gets worried.  Reports follow-up appointment with OB scheduled for next week.    HPI    Patient Care Team  Primary Care Provider: Adriano Curiel APRN    Past Medical History:     No Known Allergies  Past Medical History:   Diagnosis Date    Anxiety     Depression     Hypertension     during pregnancy     History reviewed. No pertinent surgical history.  Family History   Problem Relation Age of Onset    French Parkinson White syndrome Mother     Hypertension Father     Diabetes Paternal Grandmother        Home Medications:  Prior to Admission medications    Medication Sig Start Date End Date Taking? Authorizing Provider   Diclofenac Sodium (VOLTAREN) 1 % gel gel Apply 4 g topically to the appropriate area as directed 4 (Four) Times a Day As Needed (knee pain). 1/24/22   Adriano Curiel APRN   hydroCHLOROthiazide (HYDRODIURIL) 12.5 MG tablet Take 1 tablet by mouth Daily. 10/23/23   Adriano Curiel APRN   magnesium oxide (MAG-OX) 400 MG tablet Take 1 tablet by mouth Daily.  Patient not taking: Reported on 10/23/2023 4/21/23   Adriano Curiel APRN   Sodium Fluoride 5000 PPM 1.1 % paste  9/9/22   Provider, MD Shane   vitamin D (ERGOCALCIFEROL) 1.25 MG (31453 UT) capsule capsule Take 1 capsule  "by mouth 1 (One) Time Per Week. 10/26/23   Adriano Curiel APRTRACY        Social History:   Social History     Tobacco Use    Smoking status: Former     Current packs/day: 0.00     Average packs/day: 0.3 packs/day for 10.0 years (2.5 ttl pk-yrs)     Types: Cigarettes     Start date: 2011     Quit date: 2021     Years since quittin.9    Smokeless tobacco: Never   Vaping Use    Vaping status: Every Day    Substances: Nicotine, Flavoring    Devices: Disposable   Substance Use Topics    Alcohol use: Never    Drug use: Never         Review of Systems:  Review of Systems   I performed a 10 point review of systems which was all negative, except for the positives found in the HPI above.  Physical Exam:  /89   Pulse 76   Temp 98.4 °F (36.9 °C) (Oral)   Resp 18   Ht 152.4 cm (60\")   Wt 73.2 kg (161 lb 6 oz)   LMP 2024 (Exact Date)   SpO2 99%   Breastfeeding No   BMI 31.52 kg/m²     Physical Exam     General: Awake alert and in no acute distress     HEENT: Head normocephalic atraumatic, eyes PERRLA EOMI, nose normal, oropharynx normal.     Neck: Supple full range of motion, no meningismus, no lymphadenopathy     Heart: Regular rate and rhythm, no murmurs or rubs, 2+ radial pulses bilaterally     Lungs: Clear to auscultation bilaterally without wheezes or crackles, no respiratory distress     Abdomen: Soft, nontender, nondistended, no rebound or guarding, no rigidity     Back exam:  No L-spine tenderness.  No rash.     Skin: Warm, dry, no rash     Musculoskeletal: Normal range of motion, no lower extremity edema     Neurologic: Oriented x3, no motor deficits no sensory deficits     Psychiatric: Mood appears stable, no psychosis          Procedures:  Procedures      Medical Decision Making:      Comorbidities that affect care:    Pregnancy    External Notes reviewed:    Previous Radiological Studies: 2024, ultrasound shows IUP 6 weeks gestation      The following orders were placed and " all results were independently analyzed by me:  Orders Placed This Encounter   Procedures    US Ob Transvaginal    Derry Draw    hCG, Quantitative, Pregnancy    CBC Auto Differential    hCG, Quantitative, Pregnancy    Ambulatory Referral to Obstetrics / Gynecology    Undress & Gown    Vital Signs    Orthostatic Blood Pressure    Supplies To Bedside - Notify MD When Ready- Pelvic Cart / Set Up    CBC & Differential    Green Top (Gel)    Lavender Top    Gold Top - SST    Light Blue Top       Medications Given in the Emergency Department:  Medications - No data to display       ED Course:         Labs:    Lab Results (last 24 hours)       Procedure Component Value Units Date/Time    CBC & Differential [[id number]]  (Abnormal) Collected: 04/11/24 1524    Specimen: Blood from Arm, Right Updated: 04/11/24 1556    Narrative:      The following orders were created for panel order CBC & Differential.  Procedure                               Abnormality         Status                     ---------                               -----------         ------                     CBC Auto Differential[[id number]]        Abnormal            Final result                 Please view results for these tests on the individual orders.    hCG, Quantitative, Pregnancy [[id number]] Collected: 04/11/24 1524    Specimen: Blood from Arm, Right Updated: 04/11/24 1621     HCG Quantitative 28,778.00 mIU/mL     Narrative:      HCG Ranges by Gestational Age    Females - non-pregnant premenopausal   </= 1mIU/mL HCG  Females - postmenopausal               </= 7mIU/mL HCG    3 Weeks       5.4   -      72 mIU/mL  4 Weeks      10.2   -     708 mIU/mL  5 Weeks       217   -   8,245 mIU/mL  6 Weeks       152   -  32,177 mIU/mL  7 Weeks     4,059   - 153,767 mIU/mL  8 Weeks    31,366   - 149,094 mIU/mL  9 Weeks    59,109   - 135,901 mIU/mL  10 Weeks   44,186   - 170,409 mIU/mL  12 Weeks   27,107   - 201,615 mIU/mL  14 Weeks   24,302   -  93,646 mIU/mL  15  Weeks   12,540   -  69,747 mIU/mL  16 Weeks    8,904   -  55,332 mIU/mL  17 Weeks    8,240   -  51,793 mIU/mL  18 Weeks    9,649   -  55,271 mIU/mL      CBC Auto Differential [[id number]]  (Abnormal) Collected: 04/11/24 1524    Specimen: Blood from Arm, Right Updated: 04/11/24 1556     WBC 12.37 10*3/mm3      RBC 4.36 10*6/mm3      Hemoglobin 11.3 g/dL      Hematocrit 32.3 %      MCV 74.1 fL      MCH 25.9 pg      MCHC 35.0 g/dL      RDW 19.4 %      RDW-SD 49.6 fl      MPV 9.6 fL      Platelets 459 10*3/mm3      Neutrophil % 64.0 %      Lymphocyte % 25.6 %      Monocyte % 7.4 %      Eosinophil % 1.9 %      Basophil % 0.6 %      Immature Grans % 0.5 %      Neutrophils, Absolute 7.91 10*3/mm3      Lymphocytes, Absolute 3.17 10*3/mm3      Monocytes, Absolute 0.91 10*3/mm3      Eosinophils, Absolute 0.24 10*3/mm3      Basophils, Absolute 0.08 10*3/mm3      Immature Grans, Absolute 0.06 10*3/mm3      nRBC 0.0 /100 WBC              Imaging:    US Ob Transvaginal    Result Date: 4/11/2024  US OB TRANSVAGINAL-  Date of Exam: 4/11/2024 4:59 PM  Indication: pregnant, vaginal bleeding.  Comparison: 4/7/2024  Technique: Transvaginal ultrasound was performed to evaluate pregnancy. Real time scanning was performed with multiple image documentation per protocol.    FINDINGS: The uterus is anteverted and measures 10.0 x 6.5 x 7.0 cm. No focal myometrial abnormalities are identified. Changes of uterine fibroids are observed. There is a prominent fibroid at the posterior margin of the uterus.  There is evidence for an intrauterine gestational sac within the endometrial canal. There is a fetal pole measuring 0.67 centimeters. A small yolk sac is observed measuring 0.28 centimeters. These findings indicate an estimated gestational age on this ultrasound of the 6 weeks and 4 days. These findings correlate with the expected gestational age based on the last menstrual period. Fetal cardiac activity is identified with a fetal heart rate of  124 beats per minute.  There is a hypoechoic focus adjacent to the gestational sac within the lower uterine segment. This finding suggests a subchronic hematoma measuring 1.9 x 0.4 x 1.2 cm.  The right ovary measures 4.2 x 2.0 x 2.2 cm. The left ovary measures 4.9 x 2.7 x 3.3 cm. A hypoechoic focus is noted in the left ovary indicating the presence of a corpus luteal cyst measuring 2.8 cm. Flow is observed within the bilateral ovaries on Doppler evaluation.  No significant free fluid is seen in the pelvis. No abnormal adnexal masses are otherwise identified.      1.  Evidence for an early viable intrauterine gestation demonstrating an estimated gestational age on this ultrasound of 6 weeks and 4 days. These findings correlate with the expected gestational age based on LMP. Fetal cardiac activity is noted with a fetal heart rate of 124 beats per minute. 2.  Evidence for a subchorionic hematoma within the lower uterine segment measuring up to 1.9 cm. This finding is more pronounced when compared to the prior. Recommend close clinical follow-up. Short-term follow-up imaging may be obtained as indicated. 3.  Changes of uterine fibroids are noted. 4.  Evidence for a corpus luteal cyst involving the left ovary.   Electronically Signed By-Jose Rand MD On:4/11/2024 5:34 PM         Differential Diagnosis and Discussion:    Vaginal Bleeding: Differential diagnosis includes but is not limited to foreign body, tumor, vaginitis, dysfunctional uterine bleeding, endocrine abnormalities, coagulation disorder, systemic illness, polyps, complications of pregnancy (possible ectopic pregnancy).    All labs were reviewed and interpreted by me.  Ultrasound impression was interpreted by me.     MDM  Number of Diagnoses or Management Options  Subchorionic hemorrhage of placenta in first trimester  Vaginal bleeding in pregnancy, first trimester  Diagnosis management comments: The patient presents with vaginal bleeding. Possible  etiology of vaginal bleeding were considered, but not limited to; ectopic pregnancy, spontaneous miscarriage, gestational trophoblastic disease, implantation bleeding, molar pregnancy, disfunctional uterine bleeding, and fibroids. The patient is well appearing and resting comfortably. There are no indications for transfusion. After careful consideration the patient is safe and stable to be discharged home with an outpatient OB/GYN follow-up. Patient was counseled to return to the ER or follow-up with their OB/GYN in 48 hours especially for worsening of her bleeding or increased pain. The patient has expressed a clear and thorough understanding and his agreed to follow-up as instructed.       Amount and/or Complexity of Data Reviewed  Clinical lab tests: reviewed and ordered  Tests in the radiology section of CPT®: reviewed and ordered  Decide to obtain previous medical records or to obtain history from someone other than the patient: yes    Risk of Complications, Morbidity, and/or Mortality  Presenting problems: moderate  Diagnostic procedures: low  Management options: minimal    Patient Progress  Patient progress: stable                 Patient Care Considerations:          Consultants/Shared Management Plan:        Social Determinants of Health:          Disposition and Care Coordination:            Final diagnoses:   Subchorionic hemorrhage of placenta in first trimester   Vaginal bleeding in pregnancy, first trimester        ED Disposition       ED Disposition   Discharge    Condition   Stable    Comment   --               This medical record created using voice recognition software.             Alessia Koroma, PASTOR  04/12/24 0039

## 2024-04-17 ENCOUNTER — TELEPHONE (OUTPATIENT)
Dept: OBSTETRICS AND GYNECOLOGY | Facility: CLINIC | Age: 34
End: 2024-04-17
Payer: COMMERCIAL